# Patient Record
Sex: FEMALE | Race: WHITE | Employment: OTHER | URBAN - METROPOLITAN AREA
[De-identification: names, ages, dates, MRNs, and addresses within clinical notes are randomized per-mention and may not be internally consistent; named-entity substitution may affect disease eponyms.]

---

## 2017-01-10 PROCEDURE — 82570 ASSAY OF URINE CREATININE: CPT | Performed by: INTERNAL MEDICINE

## 2017-01-10 PROCEDURE — 82043 UR ALBUMIN QUANTITATIVE: CPT | Performed by: INTERNAL MEDICINE

## 2017-01-30 RX ORDER — ACETAMINOPHEN 325 MG/1
325 TABLET ORAL EVERY 6 HOURS PRN
Status: ON HOLD | COMMUNITY
End: 2017-02-27

## 2017-01-30 RX ORDER — ASPIRIN 325 MG
325 TABLET ORAL EVERY 6 HOURS PRN
COMMUNITY
End: 2017-02-17

## 2017-02-07 ENCOUNTER — LABORATORY ENCOUNTER (OUTPATIENT)
Dept: LAB | Facility: HOSPITAL | Age: 72
End: 2017-02-07
Attending: ORTHOPAEDIC SURGERY
Payer: MEDICARE

## 2017-02-07 ENCOUNTER — HOSPITAL ENCOUNTER (OUTPATIENT)
Dept: PHYSICAL THERAPY | Facility: HOSPITAL | Age: 72
Setting detail: THERAPIES SERIES
Discharge: HOME | End: 2017-02-07
Attending: ORTHOPAEDIC SURGERY
Payer: MEDICARE

## 2017-02-07 DIAGNOSIS — M17.11 PRIMARY OSTEOARTHRITIS OF RIGHT KNEE: ICD-10-CM

## 2017-02-07 LAB
ANTIBODY SCREEN: NEGATIVE
APTT PPP: 28.6 SECONDS (ref 25–34)
BILIRUB UR QL STRIP.AUTO: NEGATIVE
CLARITY UR REFRACT.AUTO: CLEAR
GLUCOSE UR STRIP.AUTO-MCNC: NEGATIVE MG/DL
INR BLD: 1 (ref 0.89–1.12)
KETONES UR STRIP.AUTO-MCNC: NEGATIVE MG/DL
NITRITE UR QL STRIP.AUTO: NEGATIVE
PH UR STRIP.AUTO: 8 [PH] (ref 4.5–8)
PROT UR STRIP.AUTO-MCNC: NEGATIVE MG/DL
PSA SERPL DL<=0.01 NG/ML-MCNC: 13.5 SECONDS (ref 12.3–14.8)
RBC UR QL AUTO: NEGATIVE
RH BLOOD TYPE: POSITIVE
SP GR UR STRIP.AUTO: <1.005 (ref 1–1.03)
UROBILINOGEN UR STRIP.AUTO-MCNC: <2 MG/DL

## 2017-02-07 PROCEDURE — 86900 BLOOD TYPING SEROLOGIC ABO: CPT

## 2017-02-07 PROCEDURE — 36415 COLL VENOUS BLD VENIPUNCTURE: CPT

## 2017-02-07 PROCEDURE — 85730 THROMBOPLASTIN TIME PARTIAL: CPT

## 2017-02-07 PROCEDURE — 86901 BLOOD TYPING SEROLOGIC RH(D): CPT

## 2017-02-07 PROCEDURE — 87081 CULTURE SCREEN ONLY: CPT

## 2017-02-07 PROCEDURE — 87086 URINE CULTURE/COLONY COUNT: CPT

## 2017-02-07 PROCEDURE — 85610 PROTHROMBIN TIME: CPT

## 2017-02-07 PROCEDURE — 81001 URINALYSIS AUTO W/SCOPE: CPT

## 2017-02-07 PROCEDURE — 86850 RBC ANTIBODY SCREEN: CPT

## 2017-02-27 ENCOUNTER — APPOINTMENT (OUTPATIENT)
Dept: GENERAL RADIOLOGY | Facility: HOSPITAL | Age: 72
DRG: 470 | End: 2017-02-27
Attending: PHYSICIAN ASSISTANT
Payer: MEDICARE

## 2017-02-27 ENCOUNTER — HOSPITAL ENCOUNTER (INPATIENT)
Facility: HOSPITAL | Age: 72
LOS: 2 days | Discharge: HOME HEALTH | DRG: 470 | End: 2017-03-01
Attending: ORTHOPAEDIC SURGERY | Admitting: ORTHOPAEDIC SURGERY
Payer: MEDICARE

## 2017-02-27 ENCOUNTER — ANESTHESIA EVENT (OUTPATIENT)
Dept: SURGERY | Facility: HOSPITAL | Age: 72
DRG: 470 | End: 2017-02-27
Payer: MEDICARE

## 2017-02-27 ENCOUNTER — ANESTHESIA (OUTPATIENT)
Dept: SURGERY | Facility: HOSPITAL | Age: 72
DRG: 470 | End: 2017-02-27
Payer: MEDICARE

## 2017-02-27 ENCOUNTER — SURGERY (OUTPATIENT)
Age: 72
End: 2017-02-27

## 2017-02-27 DIAGNOSIS — M17.11 PRIMARY OSTEOARTHRITIS OF RIGHT KNEE: Primary | ICD-10-CM

## 2017-02-27 PROCEDURE — 3E0T3CZ INTRODUCTION OF REGIONAL ANESTHETIC INTO PERIPHERAL NERVES AND PLEXI, PERCUTANEOUS APPROACH: ICD-10-PCS | Performed by: ANESTHESIOLOGY

## 2017-02-27 PROCEDURE — 88311 DECALCIFY TISSUE: CPT | Performed by: ORTHOPAEDIC SURGERY

## 2017-02-27 PROCEDURE — 0SRC0J9 REPLACEMENT OF RIGHT KNEE JOINT WITH SYNTHETIC SUBSTITUTE, CEMENTED, OPEN APPROACH: ICD-10-PCS | Performed by: ORTHOPAEDIC SURGERY

## 2017-02-27 PROCEDURE — 88305 TISSUE EXAM BY PATHOLOGIST: CPT | Performed by: ORTHOPAEDIC SURGERY

## 2017-02-27 PROCEDURE — 76942 ECHO GUIDE FOR BIOPSY: CPT | Performed by: ORTHOPAEDIC SURGERY

## 2017-02-27 PROCEDURE — 73560 X-RAY EXAM OF KNEE 1 OR 2: CPT

## 2017-02-27 DEVICE — ATTUNE KNEE SYSTEM TIBIAL INSERT ROTATING PLATFORM POSTERIOR STABILIZED 6 5MM AOX
Type: IMPLANTABLE DEVICE | Site: KNEE | Status: FUNCTIONAL
Brand: ATTUNE

## 2017-02-27 DEVICE — CEMENT BONE RADIOPAQ HOWMEDICA: Type: IMPLANTABLE DEVICE | Site: KNEE | Status: FUNCTIONAL

## 2017-02-27 DEVICE — ATTUNE KNEE SYSTEM FEMORAL POSTERIOR STABILIZED NARROW SIZE 6N RIGHT CEMENTED
Type: IMPLANTABLE DEVICE | Site: KNEE | Status: FUNCTIONAL
Brand: ATTUNE

## 2017-02-27 DEVICE — ATTUNE PATELLA MEDIALIZED ANATOMIC 35MM CEMENTED AOX
Type: IMPLANTABLE DEVICE | Site: KNEE | Status: FUNCTIONAL
Brand: ATTUNE

## 2017-02-27 DEVICE — ATTUNE KNEE SYSTEM TIBIAL BASE ROTATING PLATFORM SIZE 6 CEMENTED
Type: IMPLANTABLE DEVICE | Site: KNEE | Status: FUNCTIONAL
Brand: ATTUNE

## 2017-02-27 RX ORDER — HYDROMORPHONE HYDROCHLORIDE 1 MG/ML
0.5 INJECTION, SOLUTION INTRAMUSCULAR; INTRAVENOUS; SUBCUTANEOUS EVERY 2 HOUR PRN
Status: DISCONTINUED | OUTPATIENT
Start: 2017-02-27 | End: 2017-03-01

## 2017-02-27 RX ORDER — OXYCODONE HCL 10 MG/1
10 TABLET, FILM COATED, EXTENDED RELEASE ORAL
Status: COMPLETED | OUTPATIENT
Start: 2017-02-27 | End: 2017-02-27

## 2017-02-27 RX ORDER — DIPHENHYDRAMINE HYDROCHLORIDE 50 MG/ML
12.5 INJECTION INTRAMUSCULAR; INTRAVENOUS EVERY 4 HOURS PRN
Status: DISCONTINUED | OUTPATIENT
Start: 2017-02-27 | End: 2017-03-01

## 2017-02-27 RX ORDER — BISACODYL 10 MG
10 SUPPOSITORY, RECTAL RECTAL
Status: DISCONTINUED | OUTPATIENT
Start: 2017-02-27 | End: 2017-03-01

## 2017-02-27 RX ORDER — OXYCODONE HYDROCHLORIDE 15 MG/1
15 TABLET ORAL EVERY 4 HOURS PRN
Status: DISCONTINUED | OUTPATIENT
Start: 2017-02-27 | End: 2017-02-28

## 2017-02-27 RX ORDER — SODIUM PHOSPHATE, DIBASIC AND SODIUM PHOSPHATE, MONOBASIC 7; 19 G/133ML; G/133ML
1 ENEMA RECTAL ONCE AS NEEDED
Status: ACTIVE | OUTPATIENT
Start: 2017-02-27 | End: 2017-02-27

## 2017-02-27 RX ORDER — OXYCODONE HYDROCHLORIDE 5 MG/1
5 TABLET ORAL EVERY 4 HOURS PRN
Status: DISCONTINUED | OUTPATIENT
Start: 2017-02-27 | End: 2017-02-28

## 2017-02-27 RX ORDER — HYDROMORPHONE HYDROCHLORIDE 1 MG/ML
0.2 INJECTION, SOLUTION INTRAMUSCULAR; INTRAVENOUS; SUBCUTANEOUS EVERY 2 HOUR PRN
Status: DISCONTINUED | OUTPATIENT
Start: 2017-02-27 | End: 2017-03-01

## 2017-02-27 RX ORDER — POLYETHYLENE GLYCOL 3350 17 G/17G
17 POWDER, FOR SOLUTION ORAL DAILY PRN
Status: DISCONTINUED | OUTPATIENT
Start: 2017-02-27 | End: 2017-03-01

## 2017-02-27 RX ORDER — MEPERIDINE HYDROCHLORIDE 25 MG/ML
12.5 INJECTION INTRAMUSCULAR; INTRAVENOUS; SUBCUTANEOUS AS NEEDED
Status: DISCONTINUED | OUTPATIENT
Start: 2017-02-27 | End: 2017-02-27 | Stop reason: HOSPADM

## 2017-02-27 RX ORDER — ACETAMINOPHEN 325 MG/1
650 TABLET ORAL EVERY 6 HOURS PRN
Status: DISCONTINUED | OUTPATIENT
Start: 2017-02-27 | End: 2017-02-27 | Stop reason: HOSPADM

## 2017-02-27 RX ORDER — MIDAZOLAM HYDROCHLORIDE 1 MG/ML
1 INJECTION INTRAMUSCULAR; INTRAVENOUS EVERY 5 MIN PRN
Status: DISCONTINUED | OUTPATIENT
Start: 2017-02-27 | End: 2017-02-27 | Stop reason: HOSPADM

## 2017-02-27 RX ORDER — DOCUSATE SODIUM 100 MG/1
100 CAPSULE, LIQUID FILLED ORAL 2 TIMES DAILY
Status: DISCONTINUED | OUTPATIENT
Start: 2017-02-27 | End: 2017-03-01

## 2017-02-27 RX ORDER — SODIUM CHLORIDE, SODIUM LACTATE, POTASSIUM CHLORIDE, CALCIUM CHLORIDE 600; 310; 30; 20 MG/100ML; MG/100ML; MG/100ML; MG/100ML
INJECTION, SOLUTION INTRAVENOUS CONTINUOUS
Status: DISCONTINUED | OUTPATIENT
Start: 2017-02-27 | End: 2017-03-01

## 2017-02-27 RX ORDER — HYDROCODONE BITARTRATE AND ACETAMINOPHEN 10; 325 MG/1; MG/1
1-2 TABLET ORAL EVERY 4 HOURS PRN
Qty: 60 TABLET | Refills: 0 | Status: SHIPPED | OUTPATIENT
Start: 2017-03-08 | End: 2017-03-28 | Stop reason: ALTCHOICE

## 2017-02-27 RX ORDER — ONDANSETRON 2 MG/ML
4 INJECTION INTRAMUSCULAR; INTRAVENOUS EVERY 4 HOURS PRN
Status: DISCONTINUED | OUTPATIENT
Start: 2017-02-27 | End: 2017-03-01

## 2017-02-27 RX ORDER — DIPHENHYDRAMINE HYDROCHLORIDE 50 MG/ML
25 INJECTION INTRAMUSCULAR; INTRAVENOUS ONCE AS NEEDED
Status: ACTIVE | OUTPATIENT
Start: 2017-02-27 | End: 2017-02-27

## 2017-02-27 RX ORDER — ONDANSETRON 2 MG/ML
4 INJECTION INTRAMUSCULAR; INTRAVENOUS AS NEEDED
Status: DISCONTINUED | OUTPATIENT
Start: 2017-02-27 | End: 2017-02-27 | Stop reason: HOSPADM

## 2017-02-27 RX ORDER — HYDROCODONE BITARTRATE AND ACETAMINOPHEN 10; 325 MG/1; MG/1
1-2 TABLET ORAL EVERY 4 HOURS PRN
Qty: 80 TABLET | Refills: 0 | Status: SHIPPED | OUTPATIENT
Start: 2017-02-27 | End: 2017-03-09

## 2017-02-27 RX ORDER — HYDROMORPHONE HYDROCHLORIDE 1 MG/ML
0.3 INJECTION, SOLUTION INTRAMUSCULAR; INTRAVENOUS; SUBCUTANEOUS EVERY 2 HOUR PRN
Status: DISCONTINUED | OUTPATIENT
Start: 2017-02-27 | End: 2017-03-01

## 2017-02-27 RX ORDER — NALOXONE HYDROCHLORIDE 0.4 MG/ML
80 INJECTION, SOLUTION INTRAMUSCULAR; INTRAVENOUS; SUBCUTANEOUS AS NEEDED
Status: DISCONTINUED | OUTPATIENT
Start: 2017-02-27 | End: 2017-02-27 | Stop reason: HOSPADM

## 2017-02-27 RX ORDER — ACETAMINOPHEN 325 MG/1
TABLET ORAL
Status: COMPLETED
Start: 2017-02-27 | End: 2017-02-27

## 2017-02-27 RX ORDER — OXYCODONE HCL 10 MG/1
10 TABLET, FILM COATED, EXTENDED RELEASE ORAL
Status: COMPLETED | OUTPATIENT
Start: 2017-02-27 | End: 2017-02-28

## 2017-02-27 RX ORDER — SODIUM CHLORIDE 9 MG/ML
INJECTION, SOLUTION INTRAVENOUS CONTINUOUS
Status: DISCONTINUED | OUTPATIENT
Start: 2017-02-27 | End: 2017-03-01

## 2017-02-27 RX ORDER — SENNOSIDES 8.6 MG
17.2 TABLET ORAL NIGHTLY
Status: DISCONTINUED | OUTPATIENT
Start: 2017-02-27 | End: 2017-02-27

## 2017-02-27 RX ORDER — METOCLOPRAMIDE HYDROCHLORIDE 5 MG/ML
10 INJECTION INTRAMUSCULAR; INTRAVENOUS AS NEEDED
Status: DISCONTINUED | OUTPATIENT
Start: 2017-02-27 | End: 2017-02-27 | Stop reason: HOSPADM

## 2017-02-27 RX ORDER — CYCLOBENZAPRINE HCL 5 MG
5 TABLET ORAL 3 TIMES DAILY PRN
Status: DISCONTINUED | OUTPATIENT
Start: 2017-02-27 | End: 2017-03-01

## 2017-02-27 RX ORDER — MELATONIN
325
Status: DISCONTINUED | OUTPATIENT
Start: 2017-02-28 | End: 2017-03-01

## 2017-02-27 RX ORDER — SENNOSIDES 8.6 MG
17.2 TABLET ORAL NIGHTLY PRN
Status: DISCONTINUED | OUTPATIENT
Start: 2017-02-27 | End: 2017-03-01

## 2017-02-27 RX ORDER — METOCLOPRAMIDE HYDROCHLORIDE 5 MG/ML
10 INJECTION INTRAMUSCULAR; INTRAVENOUS EVERY 6 HOURS PRN
Status: DISCONTINUED | OUTPATIENT
Start: 2017-02-27 | End: 2017-03-01

## 2017-02-27 RX ORDER — DOCUSATE SODIUM 100 MG/1
100 CAPSULE, LIQUID FILLED ORAL 2 TIMES DAILY
Qty: 60 CAPSULE | Refills: 0 | Status: SHIPPED | OUTPATIENT
Start: 2017-02-27 | End: 2017-04-17 | Stop reason: ALTCHOICE

## 2017-02-27 RX ORDER — OXYCODONE HYDROCHLORIDE 10 MG/1
20 TABLET ORAL EVERY 4 HOURS PRN
Status: DISCONTINUED | OUTPATIENT
Start: 2017-02-27 | End: 2017-02-28

## 2017-02-27 RX ORDER — ACETAMINOPHEN 325 MG/1
650 TABLET ORAL 4 TIMES DAILY
Status: DISPENSED | OUTPATIENT
Start: 2017-02-27 | End: 2017-03-01

## 2017-02-27 RX ORDER — HYDROCODONE BITARTRATE AND ACETAMINOPHEN 10; 325 MG/1; MG/1
2 TABLET ORAL AS NEEDED
Status: DISCONTINUED | OUTPATIENT
Start: 2017-02-27 | End: 2017-02-27 | Stop reason: HOSPADM

## 2017-02-27 RX ORDER — DIPHENHYDRAMINE HCL 25 MG
25 CAPSULE ORAL EVERY 4 HOURS PRN
Status: DISCONTINUED | OUTPATIENT
Start: 2017-02-27 | End: 2017-03-01

## 2017-02-27 RX ORDER — HYDROMORPHONE HYDROCHLORIDE 1 MG/ML
0.4 INJECTION, SOLUTION INTRAMUSCULAR; INTRAVENOUS; SUBCUTANEOUS EVERY 5 MIN PRN
Status: DISCONTINUED | OUTPATIENT
Start: 2017-02-27 | End: 2017-02-27 | Stop reason: HOSPADM

## 2017-02-27 RX ORDER — FERROUS SULFATE 325(65) MG
1 TABLET ORAL DAILY
Qty: 30 TABLET | Refills: 0 | Status: SHIPPED | OUTPATIENT
Start: 2017-02-27 | End: 2017-03-09

## 2017-02-27 RX ORDER — OXYCODONE HYDROCHLORIDE 10 MG/1
10 TABLET ORAL EVERY 4 HOURS PRN
Status: DISCONTINUED | OUTPATIENT
Start: 2017-02-27 | End: 2017-02-28

## 2017-02-27 RX ORDER — HYDROCODONE BITARTRATE AND ACETAMINOPHEN 10; 325 MG/1; MG/1
1 TABLET ORAL AS NEEDED
Status: DISCONTINUED | OUTPATIENT
Start: 2017-02-27 | End: 2017-02-27 | Stop reason: HOSPADM

## 2017-02-27 RX ORDER — HYDROMORPHONE HYDROCHLORIDE 1 MG/ML
0.4 INJECTION, SOLUTION INTRAMUSCULAR; INTRAVENOUS; SUBCUTANEOUS EVERY 2 HOUR PRN
Status: DISCONTINUED | OUTPATIENT
Start: 2017-02-27 | End: 2017-03-01

## 2017-02-27 NOTE — PROGRESS NOTES
NURSING ADMISSION NOTE      Patient admitted via bed, post right total knee replacement. Oriented to room. Safety precautions initiated. Bed in low position. Call light in reach. Discussed post-op orders, verbalized understanding.

## 2017-02-27 NOTE — ANESTHESIA POSTPROCEDURE EVALUATION
[facility] Patient Status:  Surgery Admit   Age/Gender 74year old female MRN [id number]   Location [address] [facility] Attending Delano Titus MD   Hosp Day # 0 PCP Nancy Mercer MD       Anesthesia Po

## 2017-02-27 NOTE — ANESTHESIA PREPROCEDURE EVALUATION
PRE-OP EVALUATION    Patient Name: Trish Patel    Pre-op Diagnosis: RIGHT KNEE OSTEOARTHRITIS      Procedure(s):  RIGHT TOTAL KNEE REPLACEMENT      Surgeon(s) and Role:     Mal Cornejo MD - Primary    Pre-op vitals reviewed.         Body mass index i POLYPECTOMY 58597;  Surgeon: Itz Jasmine MD;  Location: [address] [place] [address]    PATIENT DOCUMENTED NOT TO HAVE EXPERIENCED ANY OF THE FOLLOWING EVENTS N/A 9/22/2015    Comment Procedure: COLONOSCOPY, POSSIBLE BIOPSY, POSSIBLE POLYPECTOMY 05229;  Surge

## 2017-02-27 NOTE — H&P
Lupis Hewitt   2/17/2017 10:00 AM   Office Visit   MRN:  [id number]    Description: 74year old female   Provider: Drew Urena MD   Department: [address] [place] [address]         Click to print [address] N/A 9/22/2015     Procedure: COLONOSCOPY, POSSIBLE BIOPSY, POSSIBLE POLYPECTOMY 63007;  Surgeon: Idania Richardson MD;  Location: [address] [place] [address]      PATIENT DOCUMENTED NOT TO HAVE EXPERIENCED ANY OF THE FOLLOWING EVENTS N/A 9/22/2015     Procedure: C 5/12/10      Comment  EDWARD  SCORE 2      COLONOSCOPY    2006      Comment  Repeat 5 years      COLONOSCOPY,BIOPSY  N/A  9/22/2015      Comment  Procedure: COLONOSCOPY, POSSIBLE BIOPSY, POSSIBLE POLYPECTOMY 35199;  Surgeon: Luis Giron MD;  Location: [facility] hx of allergy or asthma      EXAM:    /82 mmHg  Pulse 72  Temp(Src) 98.6 °F (37 °C)  Ht 5' 8\" (1.727 m)  Wt 191 lb (86.637 kg)  BMI 29.05 kg/m2  SpO2 99%  GENERAL: well developed, well nourished,in no apparent distress  SKIN: no rashes,no suspicious End Of Enc - as of 02/17/2017         Disp Refills Start End     Misc Natural Products (GRAPE SEED EXTRACT OR)  (Taking)         Sig: Take 1 tablet by mouth daily.     Class: Historical     Route: Oral     acetaminophen 325 MG Oral Tab  (Taking)         Si 9: 57 AM : EKG. 2.17. 17Cardiology Test - Scan on 2/22/2017 9:57 AM : EKG. 2.17.17       Patient-Level Documents:          Patient Identification - Scan on 2/7/2017 10:56 AMPatient Identification - Scan on 2/7/2017 10:56 AM         Insurance Card Scan - Scan o

## 2017-02-27 NOTE — OPERATIVE REPORT
TOTAL KNEE OPERATIVE REPORT:  Shelby Valverde       [id number]     2/1/1945    PREOP DX: RIGHT  KNEE PRIMARY OSTEOARTHRITIS  POSTOP DX:RIGHT KNEE PRIMARY OSTEOARTHRITIS  PROCEDURE: RIGHT TOTAL KNEE REPLACEMENT  SURGEON: MD FIRST Dennis Manning BLOCK.  GAPS WERE FOUND TO BE EQUAL. VALGUS/VARUS STRESS TEST WAS STABLE. FEMUR WAS FINISHED OFF WITH CHAMFER AND [address] CUTS IN USUAL FASHION. POSTERIOR FEMORAL OSTEOPHYTES WERE REMOVED. POSTERIOR CAPSULAR RELEASE WAS DONE CAREFULLY.     PROXIMAL TIBIA

## 2017-02-28 LAB
ERYTHROCYTE [DISTWIDTH] IN BLOOD BY AUTOMATED COUNT: 12.8 % (ref 11.5–16)
HCT VFR BLD AUTO: 36.5 % (ref 34–50)
HGB BLD-MCNC: 12.1 G/DL (ref 12–16)
MCH RBC QN AUTO: 29.7 PG (ref 27–33.2)
MCHC RBC AUTO-ENTMCNC: 33.2 G/DL (ref 31–37)
MCV RBC AUTO: 89.7 FL (ref 81–100)
PLATELET # BLD AUTO: 182 10(3)UL (ref 150–450)
RBC # BLD AUTO: 4.07 X10(6)UL (ref 3.8–5.1)
RED CELL DISTRIBUTION WIDTH-SD: 42.1 FL (ref 35.1–46.3)
WBC # BLD AUTO: 8.1 X10(3) UL (ref 4–13)

## 2017-02-28 PROCEDURE — 97530 THERAPEUTIC ACTIVITIES: CPT

## 2017-02-28 PROCEDURE — 97165 OT EVAL LOW COMPLEX 30 MIN: CPT

## 2017-02-28 PROCEDURE — 97162 PT EVAL MOD COMPLEX 30 MIN: CPT

## 2017-02-28 PROCEDURE — 97535 SELF CARE MNGMENT TRAINING: CPT

## 2017-02-28 PROCEDURE — 97150 GROUP THERAPEUTIC PROCEDURES: CPT

## 2017-02-28 PROCEDURE — 85027 COMPLETE CBC AUTOMATED: CPT | Performed by: ORTHOPAEDIC SURGERY

## 2017-02-28 PROCEDURE — 97116 GAIT TRAINING THERAPY: CPT

## 2017-02-28 RX ORDER — OXYCODONE HYDROCHLORIDE 5 MG/1
5 TABLET ORAL EVERY 4 HOURS PRN
Status: ACTIVE | OUTPATIENT
Start: 2017-02-28 | End: 2017-03-01

## 2017-02-28 RX ORDER — OXYCODONE HYDROCHLORIDE 15 MG/1
15 TABLET ORAL EVERY 4 HOURS PRN
Status: ACTIVE | OUTPATIENT
Start: 2017-02-28 | End: 2017-03-01

## 2017-02-28 RX ORDER — OXYCODONE HYDROCHLORIDE 10 MG/1
10 TABLET ORAL EVERY 4 HOURS PRN
Status: DISPENSED | OUTPATIENT
Start: 2017-02-28 | End: 2017-03-01

## 2017-02-28 RX ORDER — OXYCODONE HYDROCHLORIDE 10 MG/1
20 TABLET ORAL EVERY 4 HOURS PRN
Status: ACTIVE | OUTPATIENT
Start: 2017-02-28 | End: 2017-03-01

## 2017-02-28 RX ORDER — HYDROCODONE BITARTRATE AND ACETAMINOPHEN 10; 325 MG/1; MG/1
2 TABLET ORAL EVERY 4 HOURS PRN
Status: DISCONTINUED | OUTPATIENT
Start: 2017-03-01 | End: 2017-03-01

## 2017-02-28 RX ORDER — HYDROCODONE BITARTRATE AND ACETAMINOPHEN 10; 325 MG/1; MG/1
1 TABLET ORAL EVERY 4 HOURS PRN
Status: DISCONTINUED | OUTPATIENT
Start: 2017-03-01 | End: 2017-03-01

## 2017-02-28 NOTE — CM/SW NOTE
02/28/17 1300   CM/SW Referral Data   Referral Source Physician;Social Work (self-referral)   Reason for Referral Discharge planning   Informant Patient   Pertinent Medical Hx   Primary Care Physician Name [address]   Patient Info   Patient's Mental

## 2017-02-28 NOTE — PLAN OF CARE
Problem: MUSCULOSKELETAL - ADULT  Goal: Return mobility to safest level of function  INTERVENTIONS:  - Assess patient stability and activity tolerance for standing, transferring and ambulating w/ or w/o assistive devices  - Assist with transfers and ambula

## 2017-02-28 NOTE — HOME CARE LIAISON
Received referral for [facility] on d/c for SN/PT. Met with patient who is agreeable to [facility] on d/c. Agency brochure given to patient. Referral sent to [facility] via [address]    Thank you for this referral,   Bard Serrato

## 2017-02-28 NOTE — PLAN OF CARE
MUSCULOSKELETAL - ADULT    • Maintain proper alignment of affected body part Progressing        PAIN - ADULT    • Verbalizes/displays adequate comfort level or patient's stated pain goal Progressing        SAFETY ADULT - FALL    • Free from fall injury Pro

## 2017-02-28 NOTE — PLAN OF CARE
PAIN - ADULT    • Verbalizes/displays adequate comfort level or patient's stated pain goal Progressing          SAFETY ADULT - FALL    • Free from fall injury Progressing        Patient rating pain a \"2-3\" while up in chair.   Medicated with oxy IR 10mg t

## 2017-02-28 NOTE — PHYSICAL THERAPY NOTE
PHYSICAL THERAPY KNEE TREATMENT NOTE - INPATIENT     Room Number: 610/251-S     Session: 1 and 2   Number of Visits to Meet Established Goals: 5    Presenting Problem: S/p Right TKA on 02/27/17    Problem List  Active Problems:    * No active hospital pro Sitting: Fair +  Static Standing: Poor +  Dynamic Standing: Poor +    ACTIVITY TOLERANCE  Room air  No shortness of breath    AM-PAC '6-Clicks' INPATIENT SHORT FORM - BASIC MOBILITY  How much difficulty does the patient currently have. ..  -   Turning over reps   Glut Sets 10 reps 15 reps   Hip Abd/Add 10 reps 15 reps   Heel slides 10 reps 15 reps   Saq 10 reps 15 reps   SLR 10 reps 15 reps   Sitting Knee Flexion 10 reps 15 reps   Standing heel/toe raises 10 reps 15 reps   Standing knee flexion 10 reps 15 re degrees flexion      Goal #6           Goal Comments: Goals established on 2/28/2017 - all goals ongoing as of 2/28/2017

## 2017-02-28 NOTE — OCCUPATIONAL THERAPY NOTE
OCCUPATIONAL THERAPY EVALUATION - INPATIENT     Room Number: 892/219-D  Evaluation Date: 2/28/2017  Type of Evaluation: Initial  Presenting Problem: R TKR    Physician Order: IP Consult to Occupational Therapy  Reason for Therapy: ADL/IADL Dysfunction and as a  \"long time ago. \"       OBJECTIVE  Precautions:  Other (Comment) (Surgical)  Fall Risk: High fall risk    WEIGHT BEARING RESTRICTION  Weight Bearing Restriction: R lower extremity        R Lower Extremity: Weight Bearing as Tolerated balance once after sit to stand. CGA with RW to ambulate with RW, CGA and cueing for hand placement during toilet transfer. Supervision bed mobility. Patient End of Session: Up in chair;Needs met;Call light within reach;RN aware of session/findings; Al

## 2017-02-28 NOTE — PROGRESS NOTES
[facility] Hospitalist Progress Note                                                                   [address]  2/1/1945    CC:  Fu post op    Interval History:  - Doing well, pain controlle the last 72 hours. ROS: no change to ROS from my documentation yesterday, except as otherwise noted in the Interval History above.     Assessment/Plan:    S/P TKA  - Post op care per ortho.  Continue PT/OT.  Pain is currently controlled.  Xarelto for D

## 2017-02-28 NOTE — PROGRESS NOTES
Orthopedic surgery progress note    Jolane Floor Patient Status:  Inpatient    1945 MRN [id number]   [facility] 3SW-A Attending Ricardo Mcmillan MD   Hosp Day # 1 PCP Kacie Shine MD       Subjective:  No major complaints.   No

## 2017-02-28 NOTE — PHYSICAL THERAPY NOTE
PHYSICAL THERAPY KNEE EVALUATION - INPATIENT     Room Number: 775/323-X  Evaluation Date: 2/28/2017  Type of Evaluation: Initial  Physician Order: PT Eval and Treat    Presenting Problem: S/p Right TKA on 02/27/17  Reason for Therapy: Mobility Dysfunction care.Ambulated without AD.  will be able to assist as needed while recovering @ home    SUBJECTIVE  \"Pain in my knee was bad last night. Now it feels better. \" Patient was participatory & motivated.     Patient self-stated goal is to be able to walk walk in hospital room?: A Little   -   Climbing 3-5 steps with a railing?: Total       AM-PAC Score:  Raw Score: 18   PT Approx Degree of Impairment Score: 46.58%   Standardized Score (AM-PAC Scale): 43.63   CMS Modifier (G-Code): CK    FUNCTIONAL ABILITY level of function. DISCHARGE RECOMMENDATIONS  PT Discharge Recommendations: Home with home health PT    PLAN  PT Treatment Plan: Bed mobility; Endurance; Energy conservation;Patient education; Family education;Gait training;Neuromuscular re-educate;Range o

## 2017-02-28 NOTE — PLAN OF CARE
Up with 1 assist to the commode, voided freely . Tolerated being up in the recliner since 0400 but is requesting to go back to bed at this time . Slow but steady gait noted , no buckling . Will continue to monitor .

## 2017-02-28 NOTE — CONSULTS
[facility] Hospitalist Initial Consult       CC: post-op medical management s/p R TKA    History of Present Illness: Patient is a 74year old female with PMH sig for OA who presents sp the above procedure.  She tolerated the procedure well without any immediate co loss  Eyes: Negative for blurry vision  ENT: Negative for sore throat  Respiratory: Negative for cough or hemoptysis  CV: Negative for CP  GI: Negative for nausea or vomiting  MSK: Negative for myalgias  Skin: Negative for Rash  Hematology: Negative for ea [facility] Hospitalist  Pager: [phone]

## 2017-02-28 NOTE — PROGRESS NOTES
Ace wrap removed. Patient measured for tubigrip sleeve to surgical leg. Applied. Patient instructed on and verbalized understanding.

## 2017-03-01 VITALS
HEART RATE: 67 BPM | DIASTOLIC BLOOD PRESSURE: 56 MMHG | OXYGEN SATURATION: 95 % | RESPIRATION RATE: 20 BRPM | WEIGHT: 190 LBS | TEMPERATURE: 98 F | BODY MASS INDEX: 29.82 KG/M2 | SYSTOLIC BLOOD PRESSURE: 110 MMHG | HEIGHT: 67 IN

## 2017-03-01 LAB
ERYTHROCYTE [DISTWIDTH] IN BLOOD BY AUTOMATED COUNT: 12.7 % (ref 11.5–16)
HCT VFR BLD AUTO: 32.5 % (ref 34–50)
HGB BLD-MCNC: 11.1 G/DL (ref 12–16)
MCH RBC QN AUTO: 29.9 PG (ref 27–33.2)
MCHC RBC AUTO-ENTMCNC: 34.2 G/DL (ref 31–37)
MCV RBC AUTO: 87.6 FL (ref 81–100)
PLATELET # BLD AUTO: 179 10(3)UL (ref 150–450)
RBC # BLD AUTO: 3.71 X10(6)UL (ref 3.8–5.1)
RED CELL DISTRIBUTION WIDTH-SD: 40.9 FL (ref 35.1–46.3)
WBC # BLD AUTO: 10.2 X10(3) UL (ref 4–13)

## 2017-03-01 PROCEDURE — 97535 SELF CARE MNGMENT TRAINING: CPT

## 2017-03-01 PROCEDURE — 97150 GROUP THERAPEUTIC PROCEDURES: CPT

## 2017-03-01 PROCEDURE — 97116 GAIT TRAINING THERAPY: CPT

## 2017-03-01 PROCEDURE — 85027 COMPLETE CBC AUTOMATED: CPT | Performed by: ORTHOPAEDIC SURGERY

## 2017-03-01 NOTE — PROGRESS NOTES
Patient and  () attended group discharge education lunch. Discharge education provided utilizing \"joint replacement discharge instructions\" sheet. Teach back done. Questions solicited and answered. Tolerated activity well.  Initial coverlet

## 2017-03-01 NOTE — PHYSICAL THERAPY NOTE
PHYSICAL THERAPY KNEE TREATMENT NOTE - INPATIENT     Room Number: 431/371-W     Session: 3  Number of Visits to Meet Established Goals: 5    Presenting Problem: S/p Right TKA on 02/27/17    Problem List  Active Problems:    * No active hospital problems. Fair +  Static Standing: Fair -  Dynamic Standing: Poor +    ACTIVITY TOLERANCE  Room air  No shortness of breath    AM-PAC '6-Clicks' INPATIENT SHORT FORM - BASIC MOBILITY  How much difficulty does the patient currently have. ..  -   Turning over in bed (i reach;RN aware of session/findings; All patient questions and concerns addressed;SCDs in place; Ice applied    ASSESSMENT     Pt seen QD in PT group for gait training, environmental barriers, and BLE strengthening: S/P R TKA.  At this time, Pt. presents with

## 2017-03-01 NOTE — PROGRESS NOTES
[facility] Hospitalist Progress Note                                                                   [address]  2/1/1945    CC:  Fu post op    Interval History:  - Doing well, pain controlle my documentation yesterday, except as otherwise noted in the Interval History above.     Assessment/Plan:    S/P TKA  - Post op care per ortho.  Continue PT/OT.  Pain is currently controlled.  Xarelto for DVT prophylaxis.    - Labs stable     Post-operative

## 2017-03-01 NOTE — PLAN OF CARE
Impaired Activities of Daily Living    • Achieve highest/safest level of independence in self care Progressing        MUSCULOSKELETAL - ADULT    • Return mobility to safest level of function Progressing    • Maintain proper alignment of affected body part

## 2017-03-01 NOTE — PROGRESS NOTES
Post Op Day 1 Ortho Note    Status Post Nerve Block:  Type of Nerve Block: Right Femoral  Single Injection Nerve Block    Post op review: No evidence of immediate block related complications, No paresthesia noted, Able to plantar flex foot, Able to dorsifl

## 2017-03-01 NOTE — PLAN OF CARE
Patient's pain controlled on pain protocol with oxy IR 5-10mg for b/t pain. Up in chair most of shift and ambulated in halls x3. Compliant with Call, Don't Fall protocol.

## 2017-03-01 NOTE — PROGRESS NOTES
Orthopedic surgery progress note    Viveca Mealy Patient Status:  Inpatient    1945 MRN [id number]   [facility] 3SW-A Attending Soni Merritt MD   Hosp Day # 2 PCP Alvaro Cornell MD       Subjective:  No major complaints.  Lit

## 2017-03-01 NOTE — PROGRESS NOTES
Acute Pain Service    Post Op Day 2 Ortho Note     Patient states Fiorella Puneet is working well to manage pain; denies itching/nausea/dizziness. Patient able to bear weight on sx leg; equal sensation in BLE. No further recommendations. Will sign-off.  Plan for d

## 2017-03-01 NOTE — OCCUPATIONAL THERAPY NOTE
OCCUPATIONAL THERAPY TREATMENT NOTE - INPATIENT     Room Number: 959/676-L  Session: 1   Number of Visits to Meet Established Goals: 1    Presenting Problem: R TKR    History related to current admission: Pt was admitted on 2/27 for R TKR.           Problem another person does the patient currently need…  -   Putting on and taking off regular lower body clothing?: A Little  -   Bathing (including washing, rinsing, drying)?: A Little  -   Toileting, which includes using toilet, bedpan or urinal? : A Little  - supervision-met

## 2017-03-01 NOTE — PLAN OF CARE
DISCHARGE PLANNING    • Discharge to home or other facility with appropriate resources Adequate for Discharge        Impaired Activities of Daily Living    • Achieve highest/safest level of independence in self care Adequate for Discharge        Yasmeen Marie

## 2017-03-02 NOTE — PAYOR COMM NOTE
Attending Physician: No att. providers found    Review Type: ADMISSION   Reviewer: Ghazala Doan       Date: March 2, 2017 - 3:29 PM  Payor: Denise Braga Number: [id number]  Admit date: 2/27/2017 12:15 PM   Admitted from Emergency Dept as otherwise noted in HPI  Constitutional: Negative for weight loss  Eyes: Negative for blurry vision  ENT: Negative for sore throat  Respiratory: Negative for cough or hemoptysis  CV: Negative for CP  GI: Negative for nausea or vomiting  MSK: Negative for participate in the care of this patient.      Jaclyn Chaney MD  [address] [place] [address] Hospitalist  Pager: [phone]        MEDICATIONS ADMINISTERED IN LAST 1 DAY:  OxyCODONE HCl IR (ROXICODONE) immediate release tab 10 mg  Dose: 10 mg Freq: Every 4 hours PRN Route: OR

## 2017-03-02 NOTE — CM/SW NOTE
03/02/17 0800   Discharge disposition   Discharged to: Home-Health   Name of Facillity/Home Care/Hospice Residential   Discharge transportation Private car   DC 3/1/17

## 2017-03-02 NOTE — DISCHARGE SUMMARY
Discharge Summary  Patient ID:  Teresita Durhamchnamrata  [id number]  20 year old  2/1/1945    Admit date: 2/27/2017    Discharge date and time: 3/1/17    Attending Physician: No att. providers found     Reason for admission: right knee osteoarthritis    Discharge D

## 2017-03-03 NOTE — PAYOR COMM NOTE
Attending Physician: No att. providers found    Review Type: CONTINUED STAY  Reviewer: Denisse Feliz     Date: March 3, 2017 - 9:38 AM  Payor: Denise Braga Number: [id number]  Admit date: 2/27/2017 12:15 PM        REVIEWER COMMENTS  2/ therapy  4) Case management  5) Social work  6) Pain management    Procedures: right TKR    Disposition: home    Patient Instructions:    Discharge Medication List as of 3/1/2017  1:33 PM    START taking these medications    !! HYDROcodone-acetaminophen (N

## 2017-03-09 ENCOUNTER — APPOINTMENT (OUTPATIENT)
Dept: CT IMAGING | Facility: HOSPITAL | Age: 72
DRG: 643 | End: 2017-03-09
Attending: EMERGENCY MEDICINE
Payer: MEDICARE

## 2017-03-09 ENCOUNTER — APPOINTMENT (OUTPATIENT)
Dept: GENERAL RADIOLOGY | Facility: HOSPITAL | Age: 72
DRG: 643 | End: 2017-03-09
Attending: EMERGENCY MEDICINE
Payer: MEDICARE

## 2017-03-09 ENCOUNTER — HOSPITAL ENCOUNTER (INPATIENT)
Facility: HOSPITAL | Age: 72
LOS: 12 days | Discharge: HOME | DRG: 643 | End: 2017-03-21
Attending: EMERGENCY MEDICINE | Admitting: HOSPITALIST
Payer: MEDICARE

## 2017-03-09 DIAGNOSIS — D72.829 LEUKOCYTOSIS, UNSPECIFIED TYPE: ICD-10-CM

## 2017-03-09 DIAGNOSIS — E87.1 HYPONATREMIA: Primary | ICD-10-CM

## 2017-03-09 DIAGNOSIS — J18.9 COMMUNITY ACQUIRED PNEUMONIA: ICD-10-CM

## 2017-03-09 DIAGNOSIS — R41.0 DISORIENTATION: ICD-10-CM

## 2017-03-09 PROCEDURE — 80053 COMPREHEN METABOLIC PANEL: CPT | Performed by: NURSE PRACTITIONER

## 2017-03-09 PROCEDURE — 85610 PROTHROMBIN TIME: CPT | Performed by: EMERGENCY MEDICINE

## 2017-03-09 PROCEDURE — 84300 ASSAY OF URINE SODIUM: CPT | Performed by: HOSPITALIST

## 2017-03-09 PROCEDURE — 99285 EMERGENCY DEPT VISIT HI MDM: CPT

## 2017-03-09 PROCEDURE — 74177 CT ABD & PELVIS W/CONTRAST: CPT

## 2017-03-09 PROCEDURE — 83930 ASSAY OF BLOOD OSMOLALITY: CPT | Performed by: HOSPITALIST

## 2017-03-09 PROCEDURE — 87040 BLOOD CULTURE FOR BACTERIA: CPT | Performed by: EMERGENCY MEDICINE

## 2017-03-09 PROCEDURE — 85025 COMPLETE CBC W/AUTO DIFF WBC: CPT | Performed by: HOSPITALIST

## 2017-03-09 PROCEDURE — 83605 ASSAY OF LACTIC ACID: CPT | Performed by: EMERGENCY MEDICINE

## 2017-03-09 PROCEDURE — 80053 COMPREHEN METABOLIC PANEL: CPT | Performed by: EMERGENCY MEDICINE

## 2017-03-09 PROCEDURE — 81001 URINALYSIS AUTO W/SCOPE: CPT | Performed by: EMERGENCY MEDICINE

## 2017-03-09 PROCEDURE — 84443 ASSAY THYROID STIM HORMONE: CPT | Performed by: HOSPITALIST

## 2017-03-09 PROCEDURE — 96374 THER/PROPH/DIAG INJ IV PUSH: CPT

## 2017-03-09 PROCEDURE — 51701 INSERT BLADDER CATHETER: CPT

## 2017-03-09 PROCEDURE — 36415 COLL VENOUS BLD VENIPUNCTURE: CPT

## 2017-03-09 PROCEDURE — 82533 TOTAL CORTISOL: CPT | Performed by: HOSPITALIST

## 2017-03-09 PROCEDURE — 96361 HYDRATE IV INFUSION ADD-ON: CPT

## 2017-03-09 PROCEDURE — 85730 THROMBOPLASTIN TIME PARTIAL: CPT | Performed by: EMERGENCY MEDICINE

## 2017-03-09 PROCEDURE — 87081 CULTURE SCREEN ONLY: CPT | Performed by: NURSE PRACTITIONER

## 2017-03-09 PROCEDURE — 96375 TX/PRO/DX INJ NEW DRUG ADDON: CPT

## 2017-03-09 PROCEDURE — 70450 CT HEAD/BRAIN W/O DYE: CPT

## 2017-03-09 PROCEDURE — 84145 PROCALCITONIN (PCT): CPT | Performed by: NURSE PRACTITIONER

## 2017-03-09 PROCEDURE — 96376 TX/PRO/DX INJ SAME DRUG ADON: CPT

## 2017-03-09 PROCEDURE — 85025 COMPLETE CBC W/AUTO DIFF WBC: CPT | Performed by: EMERGENCY MEDICINE

## 2017-03-09 PROCEDURE — 71010 XR CHEST AP PORTABLE  (CPT=71010): CPT

## 2017-03-09 PROCEDURE — 80061 LIPID PANEL: CPT | Performed by: HOSPITALIST

## 2017-03-09 RX ORDER — LEVOFLOXACIN 5 MG/ML
500 INJECTION, SOLUTION INTRAVENOUS ONCE
Status: DISCONTINUED | OUTPATIENT
Start: 2017-03-09 | End: 2017-03-09

## 2017-03-09 RX ORDER — LORAZEPAM 2 MG/ML
0.5 INJECTION INTRAMUSCULAR ONCE
Status: COMPLETED | OUTPATIENT
Start: 2017-03-09 | End: 2017-03-09

## 2017-03-09 RX ORDER — SODIUM CHLORIDE 9 MG/ML
INJECTION, SOLUTION INTRAVENOUS CONTINUOUS
Status: DISCONTINUED | OUTPATIENT
Start: 2017-03-09 | End: 2017-03-11

## 2017-03-09 RX ORDER — LORAZEPAM 2 MG/ML
1 INJECTION INTRAMUSCULAR ONCE
Status: COMPLETED | OUTPATIENT
Start: 2017-03-09 | End: 2017-03-09

## 2017-03-09 RX ORDER — MORPHINE SULFATE 4 MG/ML
INJECTION, SOLUTION INTRAMUSCULAR; INTRAVENOUS
Status: COMPLETED
Start: 2017-03-09 | End: 2017-03-09

## 2017-03-09 RX ORDER — LORAZEPAM 2 MG/ML
INJECTION INTRAMUSCULAR
Status: COMPLETED
Start: 2017-03-09 | End: 2017-03-09

## 2017-03-09 RX ORDER — MORPHINE SULFATE 4 MG/ML
4 INJECTION, SOLUTION INTRAMUSCULAR; INTRAVENOUS ONCE
Status: COMPLETED | OUTPATIENT
Start: 2017-03-09 | End: 2017-03-09

## 2017-03-09 RX ORDER — ACETAMINOPHEN 325 MG/1
650 TABLET ORAL EVERY 6 HOURS PRN
Status: DISCONTINUED | OUTPATIENT
Start: 2017-03-09 | End: 2017-03-21

## 2017-03-09 NOTE — ED INITIAL ASSESSMENT (HPI)
Patient had knee surgery on 2/27, abdominal pain started last night. Last bowel movement yesterday. New onset confusion, worse today.

## 2017-03-09 NOTE — ED PROVIDER NOTES
Patient Seen in: [facility] Emergency Department    History   Patient presents with:  Altered Mental Status (neurologic)  Abdomen/Flank Pain (GI/)    Stated Complaint: confusion, abdominal pain.     HPI    77-year-old female presents emergency depa (TAB-A-ANDRESSA MAXIMUM) Oral Tab,  Take 1 tablet by mouth daily. Grape Seed Extract 50 MG Oral Cap,  Take 50 mg by mouth daily. HYDROcodone-acetaminophen (NORCO)  MG Oral Tab,  Take 1-2 tablets by mouth every 4 (four) hours as needed for Pain.  Not membranes are moist, there is no erythema or exudate in the posterior pharynx  Neck: Supple no JVD no lymphadenopathy no meningismus no carotid bruit  CV: Regular rate and rhythm no murmur rub  Respiratory: Clear to auscultation bilaterally no crackles no LIPID PANEL - Normal   CBC WITH DIFFERENTIAL WITH PLATELET    Narrative: The following orders were created for panel order CBC WITH DIFFERENTIAL WITH PLATELET.   Procedure                               Abnormality         Status                     -- Visualized portions of paranasal sinuses are unremarkable. Visualized portions of the mastoid air cells are unremarkable. Visualized portions of the orbits are unremarkable. IMPRESSION: Sequelae of chronic small vessel ischemic disease is noted.  No evidenc

## 2017-03-10 ENCOUNTER — APPOINTMENT (OUTPATIENT)
Dept: ULTRASOUND IMAGING | Facility: HOSPITAL | Age: 72
DRG: 643 | End: 2017-03-10
Attending: HOSPITALIST
Payer: MEDICARE

## 2017-03-10 PROBLEM — Z91.81 AT RISK FOR FALLING: Status: ACTIVE | Noted: 2017-03-10

## 2017-03-10 PROCEDURE — 84100 ASSAY OF PHOSPHORUS: CPT | Performed by: NURSE PRACTITIONER

## 2017-03-10 PROCEDURE — 82024 ASSAY OF ACTH: CPT | Performed by: INTERNAL MEDICINE

## 2017-03-10 PROCEDURE — 85025 COMPLETE CBC W/AUTO DIFF WBC: CPT | Performed by: HOSPITALIST

## 2017-03-10 PROCEDURE — 84550 ASSAY OF BLOOD/URIC ACID: CPT | Performed by: INTERNAL MEDICINE

## 2017-03-10 PROCEDURE — 82533 TOTAL CORTISOL: CPT | Performed by: INTERNAL MEDICINE

## 2017-03-10 PROCEDURE — 76770 US EXAM ABDO BACK WALL COMP: CPT

## 2017-03-10 PROCEDURE — 84133 ASSAY OF URINE POTASSIUM: CPT | Performed by: INTERNAL MEDICINE

## 2017-03-10 PROCEDURE — 83935 ASSAY OF URINE OSMOLALITY: CPT | Performed by: INTERNAL MEDICINE

## 2017-03-10 PROCEDURE — 83930 ASSAY OF BLOOD OSMOLALITY: CPT | Performed by: INTERNAL MEDICINE

## 2017-03-10 PROCEDURE — 80053 COMPREHEN METABOLIC PANEL: CPT | Performed by: NURSE PRACTITIONER

## 2017-03-10 PROCEDURE — 84300 ASSAY OF URINE SODIUM: CPT | Performed by: INTERNAL MEDICINE

## 2017-03-10 PROCEDURE — 82962 GLUCOSE BLOOD TEST: CPT

## 2017-03-10 PROCEDURE — 84132 ASSAY OF SERUM POTASSIUM: CPT | Performed by: NURSE PRACTITIONER

## 2017-03-10 PROCEDURE — 83735 ASSAY OF MAGNESIUM: CPT | Performed by: NURSE PRACTITIONER

## 2017-03-10 PROCEDURE — 84100 ASSAY OF PHOSPHORUS: CPT | Performed by: INTERNAL MEDICINE

## 2017-03-10 RX ORDER — KETOROLAC TROMETHAMINE 30 MG/ML
15 INJECTION, SOLUTION INTRAMUSCULAR; INTRAVENOUS EVERY 6 HOURS PRN
Status: DISCONTINUED | OUTPATIENT
Start: 2017-03-10 | End: 2017-03-11

## 2017-03-10 RX ORDER — COSYNTROPIN 0.25 MG/ML
0.25 INJECTION, POWDER, FOR SOLUTION INTRAMUSCULAR; INTRAVENOUS ONCE
Status: COMPLETED | OUTPATIENT
Start: 2017-03-10 | End: 2017-03-10

## 2017-03-10 RX ORDER — POTASSIUM CHLORIDE 14.9 MG/ML
20 INJECTION INTRAVENOUS ONCE
Status: COMPLETED | OUTPATIENT
Start: 2017-03-10 | End: 2017-03-11

## 2017-03-10 RX ORDER — POTASSIUM CHLORIDE 14.9 MG/ML
20 INJECTION INTRAVENOUS ONCE
Status: COMPLETED | OUTPATIENT
Start: 2017-03-10 | End: 2017-03-10

## 2017-03-10 RX ORDER — KETOROLAC TROMETHAMINE 30 MG/ML
30 INJECTION, SOLUTION INTRAMUSCULAR; INTRAVENOUS EVERY 6 HOURS PRN
Status: DISCONTINUED | OUTPATIENT
Start: 2017-03-10 | End: 2017-03-10 | Stop reason: DRUGHIGH

## 2017-03-10 NOTE — ED NOTES
Pt continues to be restless, grabbing at rt knee ACE bandage and rubbing rt thigh. Pt is unable to express verbally that she is in pain, but her body language appears signal pain at post surgical sight. Pain medication pending.

## 2017-03-10 NOTE — PAYOR COMM NOTE
Attending Physician: Ruby Ferguson MD    Review Type: ADMISSION   Reviewer: Mack Florian       Date: March 10, 2017 - 10:37 AM  Payor: Denise Braga Number: [id number]  Admit date: 3/9/2017  3:38 PM   Admitted from Emergency Dept.: yes    M MD        Patient appears to have pneumonia.  We will start the patient on Rocephin and azithromycin patient's lactic acid is negative.  However with elevated white count and the mental status changes I do feel this likely is the hyponatremia causing some Dose Route User    3/9/2017 1818 New Bag 1000 mL Intravenous Antione HORNE RN      sodium phosphate 30 mmol in dextrose 5 % 100 mL IVPB     Date Action Dose Route User    3/10/2017 0921 New Bag 30 mmol Intravenous Steven Harris RN          RESULTS LA (*)     All other components within normal limits   URIC ACID, SERUM - Abnormal; Notable for the following:     Uric Acid 1.8 (*)     All other components within normal limits   OSMOLALITY, SERUM - Abnormal; Notable for the following:     Osmolality Serum Final result                 Please view results for these tests on the individual orders. SODIUM, URINE, RANDOM   CORTISOL   CBC WITH DIFFERENTIAL WITH PLATELET    Narrative:      The following orders were created for panel order CBC WITH DIFFERENTIAL WI

## 2017-03-10 NOTE — CONSULTS
[facility]  Report of Consultation    Corrinne Gentry Patient Status:  Inpatient    1945 MRN [id number]   [facility] 4SW-A Attending Jono Su MD   Hosp Day # 1 PCP Hugh Linares MD       REASON FOR CONSULT:     Hyp SURGICAL SITE INFECTION PROPHYLAXIS.  N/A 9/22/2015    Comment Procedure: COLONOSCOPY, POSSIBLE BIOPSY, POSSIBLE POLYPECTOMY 61636;  Surgeon: Earnest Marsh MD;  Location: [address] [place] [address]    PATIENT DOCUMENTED NOT TO HAVE EXPERIENCED ANY OF THE Mardene Serene hours) at 03/10/17 0937  Last data filed at 03/10/17 0650   Gross per 24 hour   Intake 1122.8 ml   Output    750 ml   Net  372.8 ml     Wt Readings from Last 3 Encounters:  03/09/17 : 204 lb 5.9 oz (92.7 kg)  03/06/17 : 190 lb (86.183 kg)  02/27/17 : 190 l PROUR Negative 03/09/2017   UROBILINOGEN <2.0 03/09/2017   NITRITE Negative 03/09/2017   LEUUR Negative 03/09/2017   WBCUR 1-4 03/09/2017   RBCUR 0-2 03/09/2017   EPIUR None Seen 03/09/2017   BACUR None Seen 03/09/2017         IMAGING:     All imaging st care of your patient. Please do not hesitate to contact me with concerns or questions.   Critical care time > 35min    Kaelyn Wheeler MD  [address] Nephrology    3/10/2017  9:37 AM

## 2017-03-10 NOTE — H&P
[facility] Hospitalist History and Physical      Patient presents with:  Altered Mental Status (neurologic)  Abdomen/Flank Pain (GI/)       PCP: Amairani Vargas MD      History of Present Illness: Patient is a 74year old female with PMH sig for recent POLYPECTOMY 39048;  Surgeon: Luis Bear MD;  Location: [address] [place] [address]    PATIENT DOCUMENTED NOT TO HAVE EXPERIENCED ANY OF THE FOLLOWING EVENTS N/A 9/22/2015    Comment Procedure: COLONOSCOPY, POSSIBLE BIOPSY, POSSIBLE POLYPECTOMY 37107;  Surge Extremities normal, atraumatic, no cyanosis or edema. Skin: Skin color, texture, turgor normal. No rashes or lesions. Neurologic: Lethargic, moves all four extremities. Intact bilateral hand .       Data Review:    LABS:     Lab Results  Component periventricular white matter are likely sequelae of chronic small vessel ischemic disease. Ventricles and sulci are appropriate for the patient's age. No mass effect. Visualized portions of paranasal sinuses are unremarkable.  Visualized portions of the mas LIVER:  Normal.  No enlargement, atrophy, abnormal density, or significant focal lesion. BILIARY:  Cholecystectomy. PANCREAS:  Normal.  No lesion, fluid collection, ductal dilatation, or atrophy. SPLEEN:  Normal.  No enlargement or focal lesion.   KIDNEY on 2/27/17, HL who was brought to ED by family for mental status changes and abdominal pain. In the ED, CT head negative for acute findings.  CT A/P with right adrenal gland enlargement with left adrenal gland thickening with surrounding inflammatory flores FLORENTINO.

## 2017-03-10 NOTE — ED NOTES
Pt transported to unit by this RN/monitor. Pt was incontinent of urine upon arrival to ICU. Pt has had bladder emptied < than 1 hr earlier of >350 ml urine during straight cath.  Pt's restlessness may be related to her incontinence and receiving RN was info

## 2017-03-10 NOTE — ED NOTES
Pt back from CT, very restless and agitated. Pt's gown is soaked d/t incontinence. Medication ordered for agitation and gown, linen and diaper applied with assist from 3 staff. Vitals remain stable.

## 2017-03-10 NOTE — ED NOTES
Restlessness has returned, pt was medicated with Ativan as ordered. Pt to go to ICU, accompanied by this RN and monitor. Vitals are stable. Rocephin infusing as ordered. zithromax to be hung on floor.

## 2017-03-10 NOTE — ED NOTES
CT called to report pt not able to lie still for scan. Attempt to proceed with scan as best as possible. No new orders at this time.

## 2017-03-10 NOTE — CM/SW NOTE
03/10/17 1600   CM/SW Referral Data   Referral Source Social Work (self-referral)   Reason for Referral Discharge planning;Readmission   Informant Patient   Readmission Assessment   Factors that patient feels contributed to this readmission Other (only following at home. Pt found to have high sodium. Mental status is improving. Work up in progress. Anticipate pt returning home with [facility] at ID. SW to follow.     Shelby Light, 03/10/2017, 5:01 PM

## 2017-03-10 NOTE — PROGRESS NOTES
ICU  Critical Care APN Progress Note    NAME: Alonzo Logan - ROOM: 456/456-A - MRN: [id number] - Age: 74year old - :1945    History Of Present Illness:  Alonzo Logan is a 74year old female with PMHx significant for hld, oa, and just underwent Skin: Skin color, texture, turgor normal for ethnicity, no rashes or lesions, warm and dry  Neurologic: CNII-XII intact, normal strength from limited exam    Data this admission:  PROCEDURE:  CT BRAIN OR HEAD (80391)      COMPARISON:  None.      INDICATION     COMPARISON:  None.      INDICATIONS:  confusion, abdominal pain.      TECHNIQUE:  CT scanning was performed from the dome of the diaphragm to the pubic symphysis with non-ionic intravenous contrast material. Post contrast coronal MIP imaging was perfor LUNG BASES:  Small right pleural effusion measures 9 mm in depth with bilateral lower lobe nonspecific ground glass density.  Small hiatal hernia.      =====  CONCLUSION:     1.  Right adrenal gland enlargement with left adrenal gland thickening with surrou

## 2017-03-10 NOTE — ED NOTES
Report given to Guerrero Kirkpatrick RN. Antibiotics are pending. Pt to floor when cleared by ED MD and intensvist is contacted.

## 2017-03-10 NOTE — ED NOTES
Pt was observed ambulating to exam room from triage area with use of walker, accompanied by spouse. Pt's gait was slow, but steady.  Spouse reports pt was c/o \"abominal pain yesterday, prior to bedtime\", and states her appetite has been less than normal i

## 2017-03-10 NOTE — SLP NOTE
Pt remains drowsy and not appropriate for evaluation at this time per RN report. Will follow up as scheduling allows. Thank you.     Cecilia Charles M.S. [address] [facility]  Pager [phone]

## 2017-03-10 NOTE — CONSULTS
Pulmonary / Critical Care H&P/Consult       NAME: Maria Antonia Healy - ROOM: [address] [place], [place] [address]A - MRN: [id number] - Age: 74year old - :  1945    Date of Admission: 3/9/2017  3:38 PM  Admission Diagnosis: Hyponatremia [E87.1]  Disorientation [R41.0]  [facility] Spouse Name: N/A    Years of Education: N/A  Number of Children: N/A     Occupational History  None on file     Social History Main Topics   Smoking status: Never Smoker     Smokeless tobacco: Never Used    Alcohol Use: Yes    Comment: 2 WEEKLY    Drug Us 116/71 113/54   Pulse: 84 84 99 83   Temp:  98 °F (36.7 °C)     TempSrc:  Temporal     Resp: 21 23 26 21   Height:       Weight:       SpO2: 95% 94% 97% 93%     O2: 2Lnc               Wt Readings from Last 3 Encounters:  03/09/17 : 204 lb 5.9 oz (92.7 kg) ----------  CREATININE (mg/dL)   Date Value   03/10/2017 0.38*   03/09/2017 0.29*   03/09/2017 0.35*   01/10/2017 0.78   ----------]    Recent Labs   Lab  03/09/17   1627  03/09/17   2256  03/10/17   0442   ALT  30  24  23   AST  36  41  31     Pm brenton

## 2017-03-10 NOTE — PLAN OF CARE
Resumed care at [phone], pt confused and restless, pt received a dose of ativan prior to ICU arrival, unable to follow commands, incontinent of urine. Reviewed case with Dr. Valerie Mcmahan.  Dr. Watson johnson, updated with poc and pt condition, morning lab orde

## 2017-03-11 PROCEDURE — 84300 ASSAY OF URINE SODIUM: CPT | Performed by: INTERNAL MEDICINE

## 2017-03-11 PROCEDURE — 02HV33Z INSERTION OF INFUSION DEVICE INTO SUPERIOR VENA CAVA, PERCUTANEOUS APPROACH: ICD-10-PCS | Performed by: HOSPITALIST

## 2017-03-11 PROCEDURE — 85025 COMPLETE CBC W/AUTO DIFF WBC: CPT | Performed by: INTERNAL MEDICINE

## 2017-03-11 PROCEDURE — 97163 PT EVAL HIGH COMPLEX 45 MIN: CPT

## 2017-03-11 PROCEDURE — 76937 US GUIDE VASCULAR ACCESS: CPT | Performed by: NURSE PRACTITIONER

## 2017-03-11 PROCEDURE — 80048 BASIC METABOLIC PNL TOTAL CA: CPT | Performed by: INTERNAL MEDICINE

## 2017-03-11 PROCEDURE — 82570 ASSAY OF URINE CREATININE: CPT | Performed by: INTERNAL MEDICINE

## 2017-03-11 PROCEDURE — 84133 ASSAY OF URINE POTASSIUM: CPT | Performed by: INTERNAL MEDICINE

## 2017-03-11 PROCEDURE — 97530 THERAPEUTIC ACTIVITIES: CPT

## 2017-03-11 PROCEDURE — 83935 ASSAY OF URINE OSMOLALITY: CPT | Performed by: INTERNAL MEDICINE

## 2017-03-11 PROCEDURE — 82436 ASSAY OF URINE CHLORIDE: CPT | Performed by: INTERNAL MEDICINE

## 2017-03-11 PROCEDURE — 87081 CULTURE SCREEN ONLY: CPT | Performed by: HOSPITALIST

## 2017-03-11 PROCEDURE — 83735 ASSAY OF MAGNESIUM: CPT | Performed by: INTERNAL MEDICINE

## 2017-03-11 PROCEDURE — 36569 INSJ PICC 5 YR+ W/O IMAGING: CPT | Performed by: NURSE PRACTITIONER

## 2017-03-11 PROCEDURE — 82962 GLUCOSE BLOOD TEST: CPT

## 2017-03-11 PROCEDURE — B548ZZA ULTRASONOGRAPHY OF SUPERIOR VENA CAVA, GUIDANCE: ICD-10-PCS | Performed by: HOSPITALIST

## 2017-03-11 PROCEDURE — 92610 EVALUATE SWALLOWING FUNCTION: CPT

## 2017-03-11 PROCEDURE — 84100 ASSAY OF PHOSPHORUS: CPT | Performed by: INTERNAL MEDICINE

## 2017-03-11 RX ORDER — SODIUM CHLORIDE 1000 MG
2 TABLET, SOLUBLE MISCELLANEOUS 2 TIMES DAILY WITH MEALS
Status: DISCONTINUED | OUTPATIENT
Start: 2017-03-11 | End: 2017-03-11

## 2017-03-11 RX ORDER — POTASSIUM CHLORIDE 20 MEQ/1
40 TABLET, EXTENDED RELEASE ORAL EVERY 4 HOURS
Status: DISCONTINUED | OUTPATIENT
Start: 2017-03-11 | End: 2017-03-11

## 2017-03-11 RX ORDER — 3% SODIUM CHLORIDE 3 G/100ML
INJECTION, SOLUTION INTRAVENOUS CONTINUOUS
Status: DISCONTINUED | OUTPATIENT
Start: 2017-03-11 | End: 2017-03-13

## 2017-03-11 RX ORDER — SODIUM CHLORIDE 0.9 % (FLUSH) 0.9 %
10 SYRINGE (ML) INJECTION AS NEEDED
Status: DISCONTINUED | OUTPATIENT
Start: 2017-03-11 | End: 2017-03-17

## 2017-03-11 RX ORDER — ENOXAPARIN SODIUM 100 MG/ML
40 INJECTION SUBCUTANEOUS DAILY
Status: DISCONTINUED | OUTPATIENT
Start: 2017-03-12 | End: 2017-03-13

## 2017-03-11 RX ORDER — SODIUM CHLORIDE 1000 MG
2 TABLET, SOLUBLE MISCELLANEOUS ONCE
Status: DISCONTINUED | OUTPATIENT
Start: 2017-03-11 | End: 2017-03-11

## 2017-03-11 NOTE — PROGRESS NOTES
[facility]  Nephrology Progress Note    Alexandra Odonnell Attending:  Alejandra Blank MD     SUBJECTIVE     Confusion improved but still forgetful  Often pulling out her IV if not monitored  Good UOP  Na improving slightly      PHYSICAL EXAM   Vital sig All imaging studies reviewed. CT ap w contrast 3/9/17:   CONCLUSION:     1. Right adrenal gland enlargement with left adrenal gland thickening with surrounding inflammatory changes.  This is a contrast-enhanced CT and underlying hemorrhage cannot be ex

## 2017-03-11 NOTE — CONSULTS
ENDOCRINOLOGY CONSULTATION    Attending physician:  Trevin Sow MD  Consulting physican:  Leo Herrera MD    Admission Date:  3/9/2017  Consultation Date:  3/10/2017      Reason for consultation: Adrenal evaluation    Chief Complaint:  Admitted for Surgeon: Reina Trejo MD;  Location: [address] [place] [address]    PATIENT [address] [place] [address] FOR IV ANTIBIOTIC SURGICAL SITE INFECTION PROPHYLAXIS.  N/A 9/22/2015    Comment Procedure: COLONOSCOPY, POSSIBLE BIOPSY, POSSIBLE POLYPECTOMY 88106;  Surge injection 0.5 mg 0.5 mg Intravenous Once   [COMPLETED] morphINE sulfate (PF) 4 MG/ML injection      [COMPLETED] morphINE sulfate (PF) 4 MG/ML injection 4 mg 4 mg Intravenous Once   [COMPLETED] LORazepam (ATIVAN) injection 1 mg 1 mg Intravenous Once   [COMP Surgery Father      Triple bypass   • Infectious Disease Brother      AIDS   • Thyroid Disorder Mother      Goiter   • Hypertension Mother    • Cancer Other      M.  Aunt- uterine cancer           Review of Systems:    Pertinent positive and negative sympto RBC      3.80-5.10 x10(6)uL 2.92 (L)   Hemoglobin      12.0-16.0 g/dL 8.5 (L)   Hematocrit      34.0-50.0 % 24.7 (L)   Platelet Count      738.5-375.5 10(3)uL 103.0 (L)               Imaging Studies:    From 3/9/2017: CT abdomen showed:    ADRENALS: The

## 2017-03-11 NOTE — PLAN OF CARE
Assumed care of patient at 0700. Throughout the day, patient has become less confused and more alert. Sodium improving. VSS.     CONFUSION    • Confusion, delirium, dementia or psychosis is improved or at baseline Progressing        METABOLIC/FLUID AND ELEC

## 2017-03-11 NOTE — PLAN OF CARE
Assumed care for this patient at 0730: patient disoriented to time. Recent Right knee surgery 2/27. Now admitted with low sodium level 119. Sodium level 125 this am. IV fluids stopped per renal. BMP Q6h. Call renal with results.  Gonzales due to urinary retent

## 2017-03-11 NOTE — PHYSICAL THERAPY NOTE
PHYSICAL THERAPY EVALUATION - INPATIENT     Room Number: 456/456-A  Evaluation Date: 3/11/2017  Type of Evaluation: Initial  Physician Order: PT Eval and Treat    Presenting Problem: hyponatremia with recent TKA 2/27  Reason for Therapy: Mobility Dysfu SITUATION  Type of Home: House   Home Layout: Two level  Stairs to Enter : 2  Railing: No  Stairs to Bedroom: 14  Railing: Yes    Lives With: Spouse  Drives: No  Patient Owned Equipment: Rolling walker  Patient Regularly Uses: Glasses    Prior Level of Ind -   Moving from lying on back to sitting on the side of the bed?: None   How much help from another person does the patient currently need. ..   -   Moving to and from a bed to a chair (including a wheelchair)?: A Little   -   Need to walk in [facility] presents with the following impairments: balance, activity tolerance, post op weakness and poor safety awareness due to cognitive deficits at this time.   These impairments manifest themselves as functional limitations in bed mobility, transfers, gait and a

## 2017-03-11 NOTE — PROGRESS NOTES
ENDOCRINOLOGY PROGRESS NOTE    Typed by Ofe Le MD on 3/11/2017      S:  Pt sleeping but arousable. Still confused at times. Pt's  is at the bedside.         O:  /76 mmHg  Pulse 92  Temp(Src) 98.6 °F (37 °C) (Temporal)  Resp 21 enlargement measures 5.1 x 3.2 cm with diffuse left adrenal gland thickening. There is infiltration of the adjacent fat.  Infiltration also surrounds the right kidney and extends into the right paracolic gutter    Right adrenal gland enlargement with left a

## 2017-03-11 NOTE — SLP NOTE
ADULT SWALLOWING EVALUATION    ASSESSMENT & PLAN   ASSESSMENT  Orders were received for a bedside swallowing evaluation as patient demonstrated difficulty on the initial dysphagia screen.  Since the initial screen, patient is demonstrating improved alertnes Regular; Thin liquids  Dysphagia History:   No past history of dysphagia  Imaging Results:  CXR: Right lower lobe consolidation. CT of Head: Sequelae of chronic small vessel ischemic disease is noted.  No evidence of intracranial hemorrhage or extra-axial f

## 2017-03-11 NOTE — PROGRESS NOTES
[facility] Hospitalist Progress Note                                                                   [address]  2/1/1945    CC: F/U AMS, Hyponatremia    SUBJECTIVE:    Patient seen this AM, 03/11/17   0025  03/11/17   0559  03/11/17   1147   PGLU  91  131*  100*  97  94       Meds:   Scheduled:    Continuous Infusions:    PRN: ketorolac (TORADOL) injection **OR** [DISCONTINUED] ketorolac (TORADOL) injection, acetaminophen    Assessment/Plan:

## 2017-03-11 NOTE — PLAN OF CARE
CONFUSION    • Confusion, delirium, dementia or psychosis is improved or at baseline Not Progressing        METABOLIC/FLUID AND ELECTROLYTES - ADULT    • Electrolytes maintained within normal limits Not Progressing          METABOLIC/FLUID AND ELECTROLYTES

## 2017-03-11 NOTE — PROGRESS NOTES
[facility] PULMONARY/CRITICAL CARE    S: Still confused, but less then yesterday.     Meds:  • sodium phosphate IVPB  30 mmol Intravenous Once   • potassium chloride 40mEq IVPB (peripheral line)  40 mEq Intravenous Once       Prn Meds:  ketorolac (TORADOL) injecti >  124*  125*  124*   K  3.9  3.8  3.6   < >  3.5*  3.6  3.6   CL  87*  91*  93*   < >  94*  94*  92*   CO2  22.0  19.0*  22.0   < >  20.0*  20.0*  22.0   ALKPHO  76  66  61   --    --    --    --    AST  36  41  31   --    --    --    --    ALT  30  24  2

## 2017-03-12 PROBLEM — D72.829 LEUKOCYTOSIS, UNSPECIFIED TYPE: Chronic | Status: ACTIVE | Noted: 2017-03-09

## 2017-03-12 PROCEDURE — 84100 ASSAY OF PHOSPHORUS: CPT | Performed by: INTERNAL MEDICINE

## 2017-03-12 PROCEDURE — 82962 GLUCOSE BLOOD TEST: CPT

## 2017-03-12 PROCEDURE — 85025 COMPLETE CBC W/AUTO DIFF WBC: CPT | Performed by: HOSPITALIST

## 2017-03-12 PROCEDURE — 84300 ASSAY OF URINE SODIUM: CPT | Performed by: INTERNAL MEDICINE

## 2017-03-12 PROCEDURE — 82570 ASSAY OF URINE CREATININE: CPT | Performed by: INTERNAL MEDICINE

## 2017-03-12 PROCEDURE — 80048 BASIC METABOLIC PNL TOTAL CA: CPT | Performed by: INTERNAL MEDICINE

## 2017-03-12 PROCEDURE — 84133 ASSAY OF URINE POTASSIUM: CPT | Performed by: INTERNAL MEDICINE

## 2017-03-12 PROCEDURE — 83935 ASSAY OF URINE OSMOLALITY: CPT | Performed by: INTERNAL MEDICINE

## 2017-03-12 RX ORDER — POTASSIUM CHLORIDE 14.9 MG/ML
20 INJECTION INTRAVENOUS ONCE
Status: COMPLETED | OUTPATIENT
Start: 2017-03-12 | End: 2017-03-12

## 2017-03-12 RX ORDER — DOCUSATE SODIUM 100 MG/1
100 CAPSULE, LIQUID FILLED ORAL 2 TIMES DAILY
Status: DISCONTINUED | OUTPATIENT
Start: 2017-03-12 | End: 2017-03-21

## 2017-03-12 RX ORDER — 3% SODIUM CHLORIDE 3 G/100ML
INJECTION, SOLUTION INTRAVENOUS CONTINUOUS
Status: DISCONTINUED | OUTPATIENT
Start: 2017-03-12 | End: 2017-03-13

## 2017-03-12 RX ORDER — 3% SODIUM CHLORIDE 3 G/100ML
INJECTION, SOLUTION INTRAVENOUS CONTINUOUS
Status: DISCONTINUED | OUTPATIENT
Start: 2017-03-12 | End: 2017-03-12

## 2017-03-12 RX ORDER — 3% SODIUM CHLORIDE 3 G/100ML
INJECTION, SOLUTION INTRAVENOUS ONCE
Status: COMPLETED | OUTPATIENT
Start: 2017-03-12 | End: 2017-03-12

## 2017-03-12 NOTE — PLAN OF CARE
CONFUSION    • Confusion, delirium, dementia or psychosis is improved or at baseline Not Progressing        METABOLIC/FLUID AND ELECTROLYTES - ADULT    • Electrolytes maintained within normal limits Not Progressing    • Hemodynamic stability and optimal re

## 2017-03-12 NOTE — PROGRESS NOTES
Patient transferred back to ICU for hyponatremia. Upon arrival consent from  received and PICC line placed without complications. Hypertonic saline per Renal, appreciate recommendations. Discussed case with [facility]-on call Dr. James Montes.

## 2017-03-12 NOTE — PROGRESS NOTES
[facility] Hospitalist Progress Note                                                                   [address]  2/1/1945    CC: F/U AMS, Hyponatremia    SUBJECTIVE:    Overnight patient tra in this interval not displayed.        Recent Labs   Lab  03/09/17   1627  03/09/17   2256  03/10/17   0442   ALT  30  24  23   AST  36  41  31   ALB  3.0*  2.6*  2.4*       Recent Labs   Lab  03/11/17   0025  03/11/17   0559  03/11/17   1147  03/11/17   20 admission  -will continue to monitor      S/P right TKA 2/27  -was discharged on prophylactic xarelto  -now on prophylactic lovenox       Prophylaxis:   DVT with SCD's, lovenox      Mary Ann Cruz MD  [facility] Hospitalist  Pager: [phone]

## 2017-03-12 NOTE — PLAN OF CARE
Results for Dorcas Barone (MRN [id number]) as of 3/12/2017 16:32   Ref. Range 3/12/2017 16:00   Glucose Latest Ref Range: 70-99 mg/dL 86   Sodium Latest Ref Range: 136-144 mmol/L 118 (LL)   Results to nephrology, waiting for orders.  Hypertonic gtt orders

## 2017-03-12 NOTE — PROGRESS NOTES
Spoke with Dr London Lazaro, Requests patient be transferred to ICU, for central line placement and closer monitoring due to critical sodium.  Andrea Keller notified of transfer.

## 2017-03-12 NOTE — PROGRESS NOTES
Assumed pt early evening. AOx3 , following command. Denied pain/ discomfort. No cough or productive .

## 2017-03-12 NOTE — PLAN OF CARE
Received pt this am with hypertonic saline infusing. Per night RN next BMP to be drawn at 10am, 3hrs post start of transfusion. Skip 8am per renal instructions.

## 2017-03-12 NOTE — PROGRESS NOTES
[facility] Patient Status:  Inpatient    1945 MRN [id number]   [facility] 4SW-A Attending Delmar Harrison MD   Hosp Day # 3 PCP Scott Bonner MD     Critical Care Progress Note     Date of Admission: 3/ Results  Component Value Date   INR 1.29* 03/09/2017   INR 1.00 02/07/2017            Assessment and Plan      1. Encephalopathy: suspect from hyponatremia. Ct head negative, UA not indicative of infection. No evidence of pneumonia.    - no diff with protec

## 2017-03-12 NOTE — PLAN OF CARE
Received pt this am ao/self. Pt occasionally remembers she is in hospital sometimes thinks good jack/ or edward/ or at home. Easily reoriented. Attempting to pull at medical equipment and get OOB; bilateral wrist restraints, bed alarm, frequent rounding.  Hy

## 2017-03-12 NOTE — PROGRESS NOTES
[facility]  Nephrology Progress Note    Lupis Hewitt Attending:  Estephania Earl MD     SUBJECTIVE     Sodium dropped to 119 overnight and increased confusion. Transferred back to ICU and started on hypertonic saline.    Confused improved from last left adrenal gland thickening with surrounding inflammatory changes. This is a contrast-enhanced CT and underlying hemorrhage cannot be excluded to suggest Benji's disease, correlate clinically.   2. Diverticular disease without evidence for acute diverti MD  3/11/2017  10:24 AM  [facility] Nephrology

## 2017-03-13 ENCOUNTER — APPOINTMENT (OUTPATIENT)
Dept: ULTRASOUND IMAGING | Facility: HOSPITAL | Age: 72
DRG: 643 | End: 2017-03-13
Attending: NURSE PRACTITIONER
Payer: MEDICARE

## 2017-03-13 ENCOUNTER — APPOINTMENT (OUTPATIENT)
Dept: CT IMAGING | Facility: HOSPITAL | Age: 72
DRG: 643 | End: 2017-03-13
Attending: HOSPITALIST
Payer: MEDICARE

## 2017-03-13 PROCEDURE — 84295 ASSAY OF SERUM SODIUM: CPT | Performed by: HOSPITALIST

## 2017-03-13 PROCEDURE — 85378 FIBRIN DEGRADE SEMIQUANT: CPT | Performed by: INTERNAL MEDICINE

## 2017-03-13 PROCEDURE — 86022 PLATELET ANTIBODIES: CPT | Performed by: INTERNAL MEDICINE

## 2017-03-13 PROCEDURE — 85610 PROTHROMBIN TIME: CPT | Performed by: INTERNAL MEDICINE

## 2017-03-13 PROCEDURE — 84295 ASSAY OF SERUM SODIUM: CPT | Performed by: INTERNAL MEDICINE

## 2017-03-13 PROCEDURE — 36593 DECLOT VASCULAR DEVICE: CPT

## 2017-03-13 PROCEDURE — 85730 THROMBOPLASTIN TIME PARTIAL: CPT | Performed by: NURSE PRACTITIONER

## 2017-03-13 PROCEDURE — 85025 COMPLETE CBC W/AUTO DIFF WBC: CPT | Performed by: HOSPITALIST

## 2017-03-13 PROCEDURE — 85730 THROMBOPLASTIN TIME PARTIAL: CPT | Performed by: INTERNAL MEDICINE

## 2017-03-13 PROCEDURE — C9121 INJECTION, ARGATROBAN: HCPCS | Performed by: NURSE PRACTITIONER

## 2017-03-13 PROCEDURE — 82962 GLUCOSE BLOOD TEST: CPT

## 2017-03-13 PROCEDURE — 84300 ASSAY OF URINE SODIUM: CPT | Performed by: HOSPITALIST

## 2017-03-13 PROCEDURE — 97535 SELF CARE MNGMENT TRAINING: CPT

## 2017-03-13 PROCEDURE — 80076 HEPATIC FUNCTION PANEL: CPT | Performed by: INTERNAL MEDICINE

## 2017-03-13 PROCEDURE — 71260 CT THORAX DX C+: CPT

## 2017-03-13 PROCEDURE — 85384 FIBRINOGEN ACTIVITY: CPT | Performed by: INTERNAL MEDICINE

## 2017-03-13 PROCEDURE — 97162 PT EVAL MOD COMPLEX 30 MIN: CPT

## 2017-03-13 PROCEDURE — 84100 ASSAY OF PHOSPHORUS: CPT | Performed by: HOSPITALIST

## 2017-03-13 PROCEDURE — 80048 BASIC METABOLIC PNL TOTAL CA: CPT | Performed by: INTERNAL MEDICINE

## 2017-03-13 PROCEDURE — 97530 THERAPEUTIC ACTIVITIES: CPT

## 2017-03-13 PROCEDURE — 93971 EXTREMITY STUDY: CPT

## 2017-03-13 PROCEDURE — 97166 OT EVAL MOD COMPLEX 45 MIN: CPT

## 2017-03-13 PROCEDURE — 70450 CT HEAD/BRAIN W/O DYE: CPT

## 2017-03-13 PROCEDURE — A4216 STERILE WATER/SALINE, 10 ML: HCPCS | Performed by: NURSE PRACTITIONER

## 2017-03-13 RX ORDER — SODIUM CHLORIDE 1000 MG
2 TABLET, SOLUBLE MISCELLANEOUS
Status: DISCONTINUED | OUTPATIENT
Start: 2017-03-13 | End: 2017-03-15

## 2017-03-13 RX ORDER — ARGATROBAN 1 MG/ML
0.2 INJECTION, SOLUTION INTRAVENOUS CONTINUOUS
Status: DISCONTINUED | OUTPATIENT
Start: 2017-03-13 | End: 2017-03-17

## 2017-03-13 RX ORDER — ONDANSETRON 2 MG/ML
4 INJECTION INTRAMUSCULAR; INTRAVENOUS EVERY 6 HOURS PRN
Status: DISCONTINUED | OUTPATIENT
Start: 2017-03-13 | End: 2017-03-21

## 2017-03-13 RX ORDER — 3% SODIUM CHLORIDE 3 G/100ML
100 INJECTION, SOLUTION INTRAVENOUS ONCE
Status: COMPLETED | OUTPATIENT
Start: 2017-03-13 | End: 2017-03-13

## 2017-03-13 NOTE — OCCUPATIONAL THERAPY NOTE
OCCUPATIONAL THERAPY EVALUATION - INPATIENT     Room Number: 468/468-A  Evaluation Date: 3/13/2017  Type of Evaluation: Initial  Presenting Problem: Hyponatremia;  Altered mental status; recent TKR 2/27/17    Physician Order: IP Consult to Occupational Ther HOME SITUATION  Type of Home: House  Home Layout: Two level  Lives With: Spouse    Toilet and Equipment: 3-in-1 commode  Shower/Tub and Equipment: Walk-in shower; Shower chair       Occupation/Status: Retired  Hand Dominance: Right  Drives: No  Patien Strength Left: 15#  Dynamometer  Strength Right: 45#      NEUROLOGICAL FINDINGS  Neurological Findings: Coordination - Finger to Nose  Coordination - Finger to Nose: Symmetrical      ACTIVITY TOLERANCE  O2 Saturation: 95%  Room air   BP: supine: 133/75 insight, decreased balance, endurance, decreased strength, decreased motor planning and coordination. Dynamometer  Strength testing demonstrates patient is below standard  strength bilaterally and there is a significant  weakness on the left.

## 2017-03-13 NOTE — PROGRESS NOTES
[facility] Hospitalist Progress Note                                                                   [address]  2/1/1945    CC: F/U AMS, Hyponatremia    SUBJECTIVE:    Continues to have con No results for input(s): ALT, AST, ALB, AMYLASE, LIPASE, LDH in the last 72 hours.     Invalid input(s): ALPHOS, TBIL, DBIL, TPROT    Recent Labs   Lab  03/12/17   0758  03/12/17   1115  03/12/17   1633  03/12/17   2102  03/13/17   0900   PGLU  88  83 hemoglobin 12.1 post-op  -Currently with some component of dilution  -Continue to monitor  -Transfuse for hemoglobin <7    Thrombocytopenia  -133 on admit, declining  -d/c enoxaparin  -HIT/HARI sent  -started on argatroban      S/P right TKA 2/27  -was disc

## 2017-03-13 NOTE — PROGRESS NOTES
Operative leg measured for tubigrip as previous one was missing. Applied to right leg. Patient and  instructed and verbalized understanding. RN informed.

## 2017-03-13 NOTE — PROGRESS NOTES
Pulmonary Progress Note        NAME: Fredy Hastings - ROOM: 468/468-A - MRN: [id number] - Age: 74year old - : 1945        SUBJECTIVE: No events overnight, remains confused    OBJECTIVE:   17  0800 17  0900 17  1000 17  1100 7. 7* 7.7* 8.1*   MG  --   --  2.0  --   --   --   --   --   --   --   --    PHOS 2.6  --  1.6*  --  1.9*  --   --   --   --  2.7  --    < > = values in this interval not displayed.       Recent Labs   03/13/17  0714   ALT 19   AST 30   ALB 1.9*       Invali diet as tolerated  7.  Dispo: full code  -d/w   Kenton Reid  [facility] Pulmonary and Critical Care

## 2017-03-13 NOTE — PLAN OF CARE
CONFUSION    • Confusion, delirium, dementia or psychosis is improved or at baseline Not Progressing          METABOLIC/FLUID AND ELECTROLYTES - ADULT    • Glucose maintained within prescribed range Progressing    • Electrolytes maintained within normal li

## 2017-03-13 NOTE — PROGRESS NOTES
[address] [facility]  Nephrology Progress Note    Maria Antonia Healy Attending:  Clif Long MD       SUBJECTIVE:     Thinks it is October 2004 or 2006.   Able to state her name and she is in the hospital.  Says her L hand fingers are numb --> tingling after wr LYPERCENT 11.5 03/13/2017   MOPERCENT 7.5 03/13/2017   EOPERCENT 1.1 03/13/2017   BAPERCENT 0.2 03/13/2017   NE 7.54* 03/13/2017   LYMABS 1.13 03/13/2017   MOABSO 0.74* 03/13/2017   EOABSO 0.11 03/13/2017   BAABSO 0.02 03/13/2017       Lab Results  Triangle give tolvaptan due to mental status  -- increase 3% saline to 40 ml/hr  -- KCl IV 40 meq x 1 to maintain K>4  -- high protein diet  -- fluid restrict <1L/d  -- repeat serum Na, urine Na, urine osm at noon, pending results will consider small dose lasix

## 2017-03-13 NOTE — PHYSICAL THERAPY NOTE
PHYSICAL THERAPY RE-EVALUATION - INPATIENT     Room Number: 468/468-A  Evaluation Date: 3/13/2017  Type of Evaluation: Initial  Physician Order: PT Eval and Treat    Presenting Problem: return to ICU on 3/11/17 due to hyponatremia. s/p TKA 2/27/17. Comment ON 2/27/2017: right knee replacement       HOME SITUATION  Type of Home: House   Home Layout: Two level  Stairs to Enter : 2  Railing: No  Stairs to Bedroom: 14  Railing: Yes    Lives With: Spouse  Drives: No  Patient Owned Equipment: Eli Hernandez ACTIVITY TOLERANCE  O2 Saturation: >92%  Room air  No shortness of breath  Heart Rate: 90's  Blood Pressure: 763/71    AM-PAC '6-Clicks' INPATIENT SHORT FORM - BASIC MOBILITY  How much difficulty does the patient currently have. ..  -   Turning over in recommendations, role of inpt PT, DC recommendation, assessment findings, goals and expectations. Functional activity tolerated  Transfer training  hep issued.     Patient End of Session: Up in chair;Needs met;Call light within reach;RN aware of jackeline

## 2017-03-13 NOTE — PLAN OF CARE
Received pt this am alert, pleasant but confused. Bilateral wrist restraints to keep pt from pulling equiipment, wearing posey vest to keep pt in bed, also bed alarm on and frequent rounding/reorientation.      Pt on hypertonic saline infusion upon receipt

## 2017-03-13 NOTE — PAYOR COMM NOTE
Attending Physician: Roman Neil MD    Review Type: CONTINUED STAY  Reviewer: Viktor Major     Date: March 13, 2017 - 10:18 AM  Payor: Denise Young Omi Number: [id number]  Admit date: 3/9/2017  3:38 PM        Progress Notes by Roman Neil   < >   3.6   3.6   3.8   4.0   3.9   3.7    CL   93*    < >   94*    < >   94*   92*   88*   87*   88*   88*    CO2   22.0    < >   19.0*    < >   20.0*   22.0   20.0*   23.0   23.0   23.0    BUN   6*    < >   7*    < >   6*   4*   6*   6*   5*   5*    CR euvolemic on exam, hypotonic  -Renal consulted apreciate  -Possibly 2/2 SIADH due to pain/nausea.  Also concern for possible adrenal insufficiency, PM cortisol last night WNL but possibly inappropriately normal  -CT A/P with right adrenal gland enlargement Dose Route User    3/13/2017 0956 New Bag 40 mEq Intravenous Josh Alicia RN      3% NaCl (hypertonic) infusion 40 ml/hr     Date Action Dose Route User    3/13/2017 0809 Rate/Dose Change (none) Intravenous Josh Alicia RN    3/13/2017 05

## 2017-03-13 NOTE — PHYSICAL THERAPY NOTE
Pt seen by inpt PT on 3/11/17 just prior to t/f to the floor. Pt however, t/f back to ICU on 3/11/7 at 22:31 due to hyponatremia and encephalopathy. Pt will need new orders to resume inpt PT. Spoke with Anh Wilkinson, who will get new order.

## 2017-03-13 NOTE — PLAN OF CARE
Pt c/o tingling in hand. Also, per noc RN some swelling observed near picc area along with bruising. US L arm ordered; APN aware.

## 2017-03-14 PROCEDURE — 80069 RENAL FUNCTION PANEL: CPT | Performed by: INTERNAL MEDICINE

## 2017-03-14 PROCEDURE — 85730 THROMBOPLASTIN TIME PARTIAL: CPT | Performed by: NURSE PRACTITIONER

## 2017-03-14 PROCEDURE — 84295 ASSAY OF SERUM SODIUM: CPT | Performed by: INTERNAL MEDICINE

## 2017-03-14 PROCEDURE — 85730 THROMBOPLASTIN TIME PARTIAL: CPT | Performed by: HOSPITALIST

## 2017-03-14 PROCEDURE — 97110 THERAPEUTIC EXERCISES: CPT

## 2017-03-14 PROCEDURE — 82962 GLUCOSE BLOOD TEST: CPT

## 2017-03-14 PROCEDURE — 97116 GAIT TRAINING THERAPY: CPT

## 2017-03-14 PROCEDURE — C9121 INJECTION, ARGATROBAN: HCPCS | Performed by: HOSPITALIST

## 2017-03-14 PROCEDURE — 84300 ASSAY OF URINE SODIUM: CPT | Performed by: INTERNAL MEDICINE

## 2017-03-14 PROCEDURE — 85025 COMPLETE CBC W/AUTO DIFF WBC: CPT | Performed by: HOSPITALIST

## 2017-03-14 PROCEDURE — 97530 THERAPEUTIC ACTIVITIES: CPT

## 2017-03-14 PROCEDURE — 83935 ASSAY OF URINE OSMOLALITY: CPT | Performed by: INTERNAL MEDICINE

## 2017-03-14 RX ORDER — SODIUM CHLORIDE 1000 MG
2 TABLET, SOLUBLE MISCELLANEOUS ONCE
Status: COMPLETED | OUTPATIENT
Start: 2017-03-14 | End: 2017-03-14

## 2017-03-14 RX ORDER — FUROSEMIDE 10 MG/ML
10 INJECTION INTRAMUSCULAR; INTRAVENOUS ONCE
Status: COMPLETED | OUTPATIENT
Start: 2017-03-14 | End: 2017-03-14

## 2017-03-14 NOTE — PROGRESS NOTES
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        PTT   Date Value Ref Range Status   03/14/2017 68.6* 25.0-34.0 seconds Final   ----------  INR   Date Value Ref Range Stat

## 2017-03-14 NOTE — OCCUPATIONAL THERAPY NOTE
OCCUPATIONAL THERAPY TREATMENT NOTE - INPATIENT     Room Number: 468/468-A  Session: 1   Number of Visits to Meet Established Goals: 7    Presenting Problem: Hyponatremia;  Altered mental status; recent TKR 2/27/17         History related to current admissi Procedure: COLONOSCOPY, POSSIBLE BIOPSY, POSSIBLE POLYPECTOMY 01415;  Surgeon: Johanna Galvan MD;  Location: [address] [place] [address] ON 2/27/2017: right knee replacement       SUBJECTIVE  \"I don't know where I am.\" chair with min assist and min cues for RW use and hand placement. Pt was educated on and performed AROM exercises for B UE's in all major planes with min cues for proper technique and form x10 reps each. Pt was educated on safety precautions.  Pt was left i eval/education; Neuromuscluar reeducation; Fine motor coordination activities; Compensatory technique education  Rehab Potential : Good  Frequency (Obs): 5x/week      OT Goals:     ADL Goals  Patient will perform grooming: with supervision and while standing

## 2017-03-14 NOTE — PROGRESS NOTES
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   The current inf

## 2017-03-14 NOTE — CONSULTS
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   Pharmacy to manage per protocol for a goal PTT range of 46 to 93 (1.5-3 melly

## 2017-03-14 NOTE — PROGRESS NOTES
[facility] Hospitalist Progress Note                                                                   [address]  2/1/1945    CC: F/U AMS, Hyponatremia    SUBJECTIVE:    Transferred out of IC --    --   3.3   GLU  80   < >  96  89  80  83   --    --    --    --    --   86  86    < > = values in this interval not displayed.        Recent Labs   Lab  03/13/17   0714  03/14/17   0530   ALT  19   --    AST  30   --    ALB  1.9*  1.9*       Recent La hemoglobin 12.1 post-op  -Currently with some component of dilution  -Continue to monitor  -Transfuse for hemoglobin <7    Thrombocytopenia  -133 on admit, declining  -d/c enoxaparin  -HIT/HARI sent  -started on argatroban      S/P right TKA 2/27  -was disc

## 2017-03-14 NOTE — PROGRESS NOTES
Pharmacy Dosing Service: Madeleine Whelan is a 74year old female on argatroban for a left cephalic clot s/p TKR in the setting of possible HIT (HIPA pending).   The argatroban infusion has been shut off since this morning @ 0929, following a

## 2017-03-14 NOTE — PROGRESS NOTES
[facility] Patient Status:  Inpatient    1945 MRN [id number]   [facility] 4SW-A Attending Alejandra Blank MD   Hosp Day # 5 PCP Therese Estrada MD     Critical Care Progress Note     Date of Admission: 3/ Results  Component Value Date   WBC 6.6 03/14/2017   RBC 3.28 03/14/2017   HGB 9.4 03/14/2017   HCT 27.1 03/14/2017   MCV 82.6 03/14/2017   MCH 28.7 03/14/2017   MCHC 34.7 03/14/2017   RDW 12.9 03/14/2017   PLT 71.0 03/14/2017       Lab Results  Component

## 2017-03-14 NOTE — PROGRESS NOTES
[facility] Dosing Service  Argatroban Subsequent Dosing       Steffen Ardon is a 74year old female on argatroban for a suspected HIT.        PTT   Date Value Ref Range Status   03/13/2017 58.2* 25.0-34.0 seconds Final   ----------  INR   Date Value Ref

## 2017-03-14 NOTE — PROGRESS NOTES
[address] [facility]  Nephrology Progress Note    Efra Ferreira Attending:  Emma Torrez MD       SUBJECTIVE:     Started argatroban yesterday, found to have L cephalic clot. Patient up in a chair, reading newspaper, no complaints.  Had stitches removed fr 03/14/2017   PLT 71.0* 03/14/2017   MCV 82.6 03/14/2017   MCH 28.7 03/14/2017   MCHC 34.7 03/14/2017   RDW 12.9 03/14/2017   NEPRELIM 4.57 03/14/2017   NEPERCENT 69.2 03/14/2017   LYPERCENT 15.5 03/14/2017   MOPERCENT 9.6 03/14/2017   EOPERCENT 1.7 03/14/2 thought to be sufficient, CXR 3/9 with CT a/p 3/9 showed R adrenal enlargement and L adrenal thickening, no clear lung involvement. CT brain with small vessel changes. SIADH may be due to post op/nausea state.     Na increased to 124 meq/L from 117 meq/L ye

## 2017-03-14 NOTE — PROGRESS NOTES
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 PTT   Date Value Ref Range Status   03/14/2017 78.

## 2017-03-14 NOTE — PROGRESS NOTES
Admitted for hyponatremia and confusion. Knows who I am, the fact that she in a hospital this am, what procedure she recently had done. No severe pain.     Temp:  [97.8 °F (36.6 °C)-98.1 °F (36.7 °C)] 97.9 °F (36.6 °C)  Pulse:  [] 87  Resp:  [14-25]

## 2017-03-14 NOTE — PHYSICAL THERAPY NOTE
PHYSICAL THERAPY TREATMENT NOTE - INPATIENT    Room Number: 468/468-A     Session: 1   Number of Visits to Meet Established Goals: 5    Presenting Problem: return to ICU on 3/11/17 due to hyponatremia. s/p TKA 2/27/17.      Problem List  Principal Problem None                PAIN ASSESSMENT   Ratin  Location: Denies       BALANCE                                                                                                                     Static Sitting: Good  Dynamic Sitting: Fair -           Stat assist for safety. Pt needed cues for RW management during turns. During session, pt was able to recall month/year/date of her surgery but shows delayed reactions. Pt was left in chair,  in room and RN aware of session.      THERAPEUTIC EXERCISES  Lo

## 2017-03-14 NOTE — PLAN OF CARE
CONFUSION    • Confusion, delirium, dementia or psychosis is improved or at baseline Progressing        METABOLIC/FLUID AND ELECTROLYTES - ADULT    • Glucose maintained within prescribed range Progressing    • Electrolytes maintained within normal limits P

## 2017-03-14 NOTE — PROGRESS NOTES
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        PTT   Date Value Ref Range Status   03/14/2017 98.9* 25.0-34.0 seconds Final   ----------  INR   Date Value Ref Range Stat

## 2017-03-15 PROCEDURE — C9121 INJECTION, ARGATROBAN: HCPCS | Performed by: HOSPITALIST

## 2017-03-15 PROCEDURE — 85730 THROMBOPLASTIN TIME PARTIAL: CPT | Performed by: NURSE PRACTITIONER

## 2017-03-15 PROCEDURE — 97110 THERAPEUTIC EXERCISES: CPT

## 2017-03-15 PROCEDURE — 82962 GLUCOSE BLOOD TEST: CPT

## 2017-03-15 PROCEDURE — 84295 ASSAY OF SERUM SODIUM: CPT | Performed by: INTERNAL MEDICINE

## 2017-03-15 PROCEDURE — 84300 ASSAY OF URINE SODIUM: CPT | Performed by: INTERNAL MEDICINE

## 2017-03-15 PROCEDURE — 97535 SELF CARE MNGMENT TRAINING: CPT

## 2017-03-15 PROCEDURE — 97116 GAIT TRAINING THERAPY: CPT

## 2017-03-15 PROCEDURE — 83935 ASSAY OF URINE OSMOLALITY: CPT | Performed by: INTERNAL MEDICINE

## 2017-03-15 PROCEDURE — 80069 RENAL FUNCTION PANEL: CPT | Performed by: INTERNAL MEDICINE

## 2017-03-15 RX ORDER — FUROSEMIDE 10 MG/ML
20 INJECTION INTRAMUSCULAR; INTRAVENOUS ONCE
Status: COMPLETED | OUTPATIENT
Start: 2017-03-15 | End: 2017-03-15

## 2017-03-15 RX ORDER — SODIUM CHLORIDE 1000 MG
1 TABLET, SOLUBLE MISCELLANEOUS ONCE
Status: DISCONTINUED | OUTPATIENT
Start: 2017-03-15 | End: 2017-03-15

## 2017-03-15 RX ORDER — SODIUM CHLORIDE 1000 MG
3 TABLET, SOLUBLE MISCELLANEOUS
Status: DISCONTINUED | OUTPATIENT
Start: 2017-03-15 | End: 2017-03-15 | Stop reason: SDUPTHER

## 2017-03-15 RX ORDER — SODIUM CHLORIDE 1000 MG
3 TABLET, SOLUBLE MISCELLANEOUS
Status: DISCONTINUED | OUTPATIENT
Start: 2017-03-15 | End: 2017-03-16

## 2017-03-15 RX ORDER — SODIUM CHLORIDE 1000 MG
3 TABLET, SOLUBLE MISCELLANEOUS ONCE
Status: COMPLETED | OUTPATIENT
Start: 2017-03-15 | End: 2017-03-15

## 2017-03-15 NOTE — PROGRESS NOTES
[address] [place]  Nephrology Progress Note    Lupis Hewitt Attending:  Estephania Earl MD       SUBJECTIVE:     Patient's  states her mental status is the same as yesterday.   Was net neg 1.9 L with IV lasix yesterday, urine osm decreased to 300s s MCHC 34.7 03/14/2017   RDW 12.9 03/14/2017   NEPRELIM 4.57 03/14/2017   NEPERCENT 69.2 03/14/2017   LYPERCENT 15.5 03/14/2017   MOPERCENT 9.6 03/14/2017   EOPERCENT 1.7 03/14/2017   BAPERCENT 0.2 03/14/2017   NE 4.57 03/14/2017   LYMABS 1.02 03/14/2017 showed R adrenal enlargement and L adrenal thickening, no clear lung involvement. CT brain with small vessel changes. SIADH may be due to post op/nausea state. Na remain stable 121-123 mg/dl on fluid restriction/salt tab/lasix.     Will try escalated dos

## 2017-03-15 NOTE — PAYOR COMM NOTE
Attending Physician: Kleber Huston MD    Review Type: CONTINUED STAY  Reviewer: Essence Pa     Date: March 15, 2017 - 1:39 PM  Payor: MarinaTasha Braga Number: [id number]  Admit date: 3/9/2017  3:38 PM        REVIEWER COMMENTS    3-14-17: 3 g Oral Jodine Bloodgood D, RN      sodium chloride tab 3 g     Date Action Dose Route User    3/15/2017 1304 Given 3 g Oral Maralelaurita Lloyd RN          Na remain stable 121-123 mg/dl on fluid restriction/salt tab/lasix.     Will try escalated doses of sal

## 2017-03-15 NOTE — PLAN OF CARE
CONFUSION    • Confusion, delirium, dementia or psychosis is improved or at baseline Progressing        Impaired Functional Mobility    • Achieve highest/safest level of mobility/gait Progressing        METABOLIC/FLUID AND ELECTROLYTES - ADULT    • Glucose

## 2017-03-15 NOTE — CM/SW NOTE
DANNA spoke with patient's , Carrie Champagne ([phone]) to discuss discharge plans. DANNA and Hieu discussed recommended 24 hour supervision. Carrie Champagne had many concerns about providing 24 hour car to patient including, \"what if I have to go to the store?

## 2017-03-15 NOTE — PROGRESS NOTES
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        PTT   Date Value Ref Range Status

## 2017-03-15 NOTE — OCCUPATIONAL THERAPY NOTE
OCCUPATIONAL THERAPY TREATMENT NOTE - INPATIENT     Room Number: 942/837-D  Session: 2   Number of Visits to Meet Established Goals: 7    Presenting Problem: Hyponatremia;  Altered mental status; recent TKR 2/27/17    History related to current admission: P home.    OBJECTIVE  Precautions: Bed/chair alarm;Limb alert - left; Other (Comment) (R TKR 17 )    WEIGHT BEARING RESTRICTION  Weight Bearing Restriction: None                PAIN ASSESSMENT  Ratin  Location: N/A        ACTIVITY TOLERANCE  O2 Satur TKR a few weeks ago. Pt is requiring increased assistance with LE dressing and standing balance. Current limitations: decreased standing balance and standing endurance. Pt will benefit from skilled OT services to maximize independence with ADLs.        O

## 2017-03-15 NOTE — PHYSICAL THERAPY NOTE
PHYSICAL THERAPY TREATMENT NOTE - INPATIENT    Room Number: 582    Session: 2   Number of Visits to Meet Established Goals: 5    Presenting Problem: return to ICU on 3/11/17 due to hyponatremia. s/p TKA 2/27/17.      Problem List  Principal Problem:    Hy pt denies        BALANCE                                                                                                                     Static Sitting: Good  Dynamic Sitting: Fair -           Static Standing: Fair -  Dynamic Standing: Poor +    ACTIVI Pt left in BS chair c alarm pad on . RN and PCT aware.              THERAPEUTIC EXERCISES  Lower Extremity Alternating marching  Ankle pumps  Hip AB/AD  Heel slides  LAQ  Leg slides        Upper Extremity      Position Sitting     Repetitions   10-20   Sets

## 2017-03-15 NOTE — PLAN OF CARE
CONFUSION    • Confusion, delirium, dementia or psychosis is improved or at baseline Not Progressing          PAIN - ADULT    • Verbalizes/displays adequate comfort level or patient's stated pain goal Progressing        Patient/Family Goals    • Patient/Fa

## 2017-03-15 NOTE — CONSULTS
[facility]  Report of Consultation    Jose Brown Patient Status:  Inpatient    1945 MRN [id number]   [facility] 5NW-A Attending Lee Smallwood MD   Hosp Day # 6 PCP Rosa M Anne MD     REASON FOR CONSULTATION: History   Problem Relation Age of Onset   • Heart Disorder Father      CHF   • Other[Other] [OTHER] Mother      DEMENTIA   • Diabetes Father    • Heart Surgery Father      Triple bypass   • Infectious Disease Brother      AIDS   • Thyroid Disorder Mother edema. Neurological: Grossly intact.           DIAGNOSTIC WORK UP:     Laboratory Data:      Lab Results  Component Value Date   CREATSERUM 0.48 03/15/2017   BUN 6 03/15/2017    03/15/2017   K 4.3 03/15/2017   CL 90 03/15/2017   CO2 23.0 03/15/2017 pneumonia     At risk for falling      1. S/p Knee surgery   2. SIADH   3.  Thrombocytopenia   This is a recent development post knee surgery   This could be post op as well as related to HIPA and HIT  She is on argatroban  No evidence of any thrombosis   A

## 2017-03-15 NOTE — PROGRESS NOTES
[facility] Hospitalist Progress Note                                                                   [address]  2/1/1945    CC: F/U AMS, Hyponatremia    SUBJECTIVE:    Patient seen in after ALB  1.9*  1.9*  2.1*       Recent Labs   Lab  03/14/17   1630  03/14/17   2034  03/15/17   0758  03/15/17   1205  03/15/17   1650   PGLU  95  115*  94  94  135*       Meds:   Scheduled:   • sodium chloride  3 g Oral TID CC   • alteplase (CATHFLO) IV pus consulted    S/P right TKA 2/27  -was discharged on prophylactic xarelto  -started on prophylactic lovenox, but held due to TCP    LUE swelling  -Duplex negative for DVT, nonocclusive thrombus within proximal to left basilic vein      Prophylaxis:   DVT wi

## 2017-03-15 NOTE — PROGRESS NOTES
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        PTT   Date Value Ref Range Status   03/15/2017 74.5* 25.0-34.0 seconds Final   ----------  INR   Date Value Re

## 2017-03-16 PROCEDURE — 97110 THERAPEUTIC EXERCISES: CPT

## 2017-03-16 PROCEDURE — 85027 COMPLETE CBC AUTOMATED: CPT | Performed by: INTERNAL MEDICINE

## 2017-03-16 PROCEDURE — 97116 GAIT TRAINING THERAPY: CPT

## 2017-03-16 PROCEDURE — 82962 GLUCOSE BLOOD TEST: CPT

## 2017-03-16 PROCEDURE — 97530 THERAPEUTIC ACTIVITIES: CPT

## 2017-03-16 PROCEDURE — 80069 RENAL FUNCTION PANEL: CPT | Performed by: INTERNAL MEDICINE

## 2017-03-16 PROCEDURE — 84295 ASSAY OF SERUM SODIUM: CPT | Performed by: INTERNAL MEDICINE

## 2017-03-16 PROCEDURE — 85025 COMPLETE CBC W/AUTO DIFF WBC: CPT | Performed by: HOSPITALIST

## 2017-03-16 PROCEDURE — 85730 THROMBOPLASTIN TIME PARTIAL: CPT | Performed by: HOSPITALIST

## 2017-03-16 RX ORDER — TOLVAPTAN 15 MG/1
15 TABLET ORAL DAILY
Status: DISCONTINUED | OUTPATIENT
Start: 2017-03-16 | End: 2017-03-17

## 2017-03-16 RX ORDER — POTASSIUM CHLORIDE 20 MEQ/1
40 TABLET, EXTENDED RELEASE ORAL ONCE
Status: COMPLETED | OUTPATIENT
Start: 2017-03-16 | End: 2017-03-16

## 2017-03-16 NOTE — PHYSICAL THERAPY NOTE
PHYSICAL THERAPY TREATMENT NOTE - INPATIENT    Room Number: 019    Session: 3  Number of Visits to Meet Established Goals: 5    Presenting Problem: return to ICU on 3/11/17 due to hyponatremia. s/p TKA 2/27/17.      Problem List  Principal Problem:    Hyp None                PAIN ASSESSMENT   Ratin  Location: pt denies        BALANCE                                                                                                                     Static Sitting: Good  Dynamic Sitting: Fair - motor planning and problem solving. After seated rest, pt required CGA to stand from [facility] chair, and performed standing therex c CGA and RW for bal. Pt returned to [facility] chair, alarm set, table placed in front, breakfast tray arriving.  Pt educated on safe mobil independent      Goal #4  Asc/madi 11 steps with railing and supervision.    Goal #5  Compliance with activity recommendations.      Goal #6      Goal Comments: Goals established on 3/13/2017. Ongoing 3/16/17.

## 2017-03-16 NOTE — PROGRESS NOTES
[facility]  Nephrology Progress Note    Bernarda Romero Attending:  Maggy Anne MD       SUBJECTIVE:      thinks patient is still a lil foggy in her thinking but she endorses thirst and per nursing patient is able to drink to thirst.  No cp, NEPRELIM 4.57 03/14/2017   NEPERCENT 69.2 03/14/2017   LYPERCENT 15.5 03/14/2017   MOPERCENT 9.6 03/14/2017   EOPERCENT 1.7 03/14/2017   BAPERCENT 0.2 03/14/2017   NE 4.57 03/14/2017   LYMABS 1.02 03/14/2017   MOABSO 0.63* 03/14/2017   EOABSO 0.11 03/14/ tolvaptan today.     SIADH:  -- stop fluid restriction, NaCl tabs, and lasix  -- start tolvaptan 15 mg x 1 today, goal Na in 24 hours ~130 mmol/L  -- check BMP in 4-5 hours  -- allow patient to drink to thirst, have water available to patient at bedside

## 2017-03-16 NOTE — PAYOR COMM NOTE
Attending Physician: Tabatha Farrell MD    Review Type: CONTINUED STAY  Reviewer: Cheryl Grewal     Date: March 16, 2017 - 1:23 PM  Payor: Denise Braga Number: [id number]  Admit date: 3/9/2017  3:38 PM        REVIEWER COMMENTS:  PLEASE FAX 1109 Given 40 mEq Oral Cydne Saad, CRISTINA      sodium chloride tab 3 g     Date Action Dose Route User    3/16/2017 0847 Given 3 g Oral Cydnlaurita Nash RN    3/15/2017 1610 Given 3 g Oral Cydne Saad, RN      tolvaptan [facility]) tab 15 mg     Date

## 2017-03-16 NOTE — PLAN OF CARE
CONFUSION    • Confusion, delirium, dementia or psychosis is improved or at baseline Progressing        METABOLIC/FLUID AND ELECTROLYTES - ADULT    • Glucose maintained within prescribed range Progressing        PAIN - ADULT    • Verbalizes/displays adequa

## 2017-03-16 NOTE — CONSULTS
120 [facility] Dosing Service  Argatroban Subsequent Dosing       Ethel Gibson is a 74year old female on argatroban for HIPA positive.       PTT   Date Value Ref Range Status   03/16/2017 54.4* 25.0-34.0 seconds Final   ----------  INR   Date Value Ref Ra

## 2017-03-16 NOTE — PROGRESS NOTES
[facility]  Progress Note    Alonzo Logan Patient Status:  Inpatient    1945 MRN [id number]   [facility] 5NW-A Attending Ina Ramirez MD   Hosp Day # 7 PCP Idania Dawson MD     Subjective:    She is better today  Amb

## 2017-03-17 PROCEDURE — 82607 VITAMIN B-12: CPT | Performed by: OTHER

## 2017-03-17 PROCEDURE — 83090 ASSAY OF HOMOCYSTEINE: CPT | Performed by: OTHER

## 2017-03-17 PROCEDURE — 82140 ASSAY OF AMMONIA: CPT | Performed by: OTHER

## 2017-03-17 PROCEDURE — 95819 EEG AWAKE AND ASLEEP: CPT

## 2017-03-17 PROCEDURE — 84439 ASSAY OF FREE THYROXINE: CPT | Performed by: OTHER

## 2017-03-17 PROCEDURE — 97535 SELF CARE MNGMENT TRAINING: CPT

## 2017-03-17 PROCEDURE — 85730 THROMBOPLASTIN TIME PARTIAL: CPT | Performed by: HOSPITALIST

## 2017-03-17 PROCEDURE — 84132 ASSAY OF SERUM POTASSIUM: CPT | Performed by: HOSPITALIST

## 2017-03-17 PROCEDURE — 83735 ASSAY OF MAGNESIUM: CPT | Performed by: OTHER

## 2017-03-17 PROCEDURE — 82962 GLUCOSE BLOOD TEST: CPT

## 2017-03-17 PROCEDURE — 84295 ASSAY OF SERUM SODIUM: CPT | Performed by: INTERNAL MEDICINE

## 2017-03-17 PROCEDURE — 85025 COMPLETE CBC W/AUTO DIFF WBC: CPT | Performed by: HOSPITALIST

## 2017-03-17 PROCEDURE — 84443 ASSAY THYROID STIM HORMONE: CPT | Performed by: OTHER

## 2017-03-17 PROCEDURE — 97530 THERAPEUTIC ACTIVITIES: CPT

## 2017-03-17 PROCEDURE — 97116 GAIT TRAINING THERAPY: CPT

## 2017-03-17 PROCEDURE — 80069 RENAL FUNCTION PANEL: CPT | Performed by: INTERNAL MEDICINE

## 2017-03-17 RX ORDER — TOLVAPTAN 30 MG/1
30 TABLET ORAL DAILY
Status: DISCONTINUED | OUTPATIENT
Start: 2017-03-18 | End: 2017-03-21

## 2017-03-17 NOTE — OCCUPATIONAL THERAPY NOTE
OCCUPATIONAL THERAPY TREATMENT NOTE - INPATIENT     Room Number: 468/468-A  Session: 3   Number of Visits to Meet Established Goals: 7    Presenting Problem: Hyponatremia;  Altered mental status; recent TKR 2/27/17         History related to current admissi Procedure: COLONOSCOPY, POSSIBLE BIOPSY, POSSIBLE POLYPECTOMY 47237;  Surgeon: Itz Jasmine MD;  Location: [address] [place] [address] ON 2/27/2017: right knee replacement       SUBJECTIVE  \"I went on a walk to the ha to wash her hands and comb her hair standing at the sink with supervision. Pt performed functional mobility with RW and with min assist. Pt performed standing>sit in chair with min assist and min cues for RW use and hand placement.  Pt was educated on safet education;Patient/Family training;Equipment eval/education; Neuromuscluar reeducation; Fine motor coordination activities; Compensatory technique education  Rehab Potential : Good  Frequency (Obs): 5x/week      OT Goals:     ADL Goals  Patient will perform gr

## 2017-03-17 NOTE — PLAN OF CARE
Patient is alert x4, but intermit confusion.    argatraban gtt stopped  Continue to monitor na levels  Encourage drinking water when thirsty  Refusing scds at this time, will reattempt  Missing patient glasses, appropriate people contacted  Neurology consul Hyponatremia     Disorientation     Leukocytosis, unspecified type     Community acquired pneumonia     At risk for falling       Active issue(s) requiring resolution prior to discharge: confusion    Anticipated discharge date: 3/18/2017     Current discha

## 2017-03-17 NOTE — PLAN OF CARE
CONFUSION    • Confusion, delirium, dementia or psychosis is improved or at baseline Progressing        Impaired Activities of Daily Living    • Achieve highest/safest level of independence in self care Progressing        Impaired Functional Mobility    •

## 2017-03-17 NOTE — PHYSICAL THERAPY NOTE
PHYSICAL THERAPY TREATMENT NOTE - INPATIENT    Room Number: 215    Session: 4  Number of Visits to Meet Established Goals: 5    Presenting Problem: return to ICU on 3/11/17 due to hyponatremia. s/p TKA 2/27/17.      Problem List  Principal Problem:    Hyp 0  Location: pt denies        BALANCE                                                                                                                     Static Sitting: Good  Dynamic Sitting: Fair -           Static Standing: Fair -  Dynamic Standing:  Lakia Chand Position Sitting     Repetitions   10   Sets   1     Patient End of Session: Up in chair;Needs met;Call light within reach;RN aware of session/findings; All patient questions and concerns addressed; Alarm set; Family present    ASSESSMENT     Patient is a 7

## 2017-03-17 NOTE — PROGRESS NOTES
[facility]  Progress Note    Lupis Hewitt Patient Status:  Inpatient    1945 MRN [id number]   [facility] 5NW-A Attending Gael Pizano MD   Hosp Day # 8 PCP Nancy Mercer MD     Subjective:    She is better today  No

## 2017-03-17 NOTE — PROGRESS NOTES
[address] [place]  Nephrology Progress Note    Kody Johnston Attending:  Tabatha Farrell MD       SUBJECTIVE:     Patient states she is thirsty. Drinking to thirst appropriately per patient. No cp or sob. No leg swelling.   Has no issues emptying bladder or 83.3 03/17/2017   MCH 28.4 03/17/2017   MCHC 34.1 03/17/2017   RDW 13.2 03/17/2017   NEPRELIM 3.84 03/17/2017   NEPERCENT 63.4 03/17/2017   LYPERCENT 18.8 03/17/2017   MOPERCENT 11.1 03/17/2017   EOPERCENT 2.1 03/17/2017   BAPERCENT 0.8 03/17/2017   NE 3.8 improved to 130 mmol/L. Continues to seem inattentive and confused despite improvement in sodium levels.     SIADH:  -- continue tolvaptan 15 mg/d  -- repeat Na level at noon, 6 pm  -- NO FLUID RESTRICTION, encourage patient to drink to thirst and have wate

## 2017-03-17 NOTE — PROGRESS NOTES
[facility] Hospitalist Progress Note                                                                   [facility] CONY Lucas  2/1/1945    CC: F/U AMS, Hyponatremia    SUBJECTIVE:  Pt reporting \"I fell g Intravenous Once   • docusate sodium  100 mg Oral BID     Continuous Infusions:   • Argatroban 0.2 mcg/kg/min (03/16/17 0014)     PRN: ondansetron HCl, Normal Saline Flush, acetaminophen    Assessment/Plan:  Principal Problem:    Hyponatremia  Active Probl proximal to left basilic vein    Prophylaxis:   DVT with SCD's.      Aroldo Loco MD  [facility] IM Hospitalist  Pager: [phone]

## 2017-03-17 NOTE — CONSULTS
[facility]    PATIENT'S NAME: Dorcas Barone   [facility] PHYSIC[facility] Ada: Michael Person M.D.    PATIENT ACCOUNT#:   [de-identified]    LOCATION:  [address] [place] [address]  MEDICAL RECORD #:   [id number]       DATE OF BIRTH:  [birth year] allergies. SOCIAL HISTORY:  She lives with her . She has never smoked, not using alcohol daily. FAMILY HISTORY:  Heart disease, dementia, diabetes, thyroid disorder, AIDS, and cancer.     REVIEW OF SYSTEMS:  The patient denies headaches and de study and it showed no seizure activity. RECOMMENDATIONS:  Other metabolic etiologies should be ruled out. We will check her UA to rule out UTI and also check magnesium, B12, ammonia, homocysteine, and TSH.   If her metabolic evaluation is normal, als

## 2017-03-17 NOTE — PROGRESS NOTES
[facility] Hospitalist Progress Note                                                                   [facility] CONY Lucas  2/1/1945    CC: F/U AMS, Hyponatremia    SUBJECTIVE:  Pt reporting \"I fell g Oral BID     Continuous Infusions:      PRN: ondansetron HCl, Normal Saline Flush, acetaminophen    Assessment/Plan:  Principal Problem:    Hyponatremia  Active Problems:    Disorientation    Leukocytosis, unspecified type    Community acquired pneumonia thrombocytopenia/HIT    LUE swelling  -Duplex negative for DVT, nonocclusive thrombus within proximal to left basilic vein    Prophylaxis:   DVT with SCD's. Dispo: plan for dc home tomorrow if ok with neurology.  Family aware of this plan and is concern

## 2017-03-17 NOTE — PROGRESS NOTES
03/17/17 0300   Provider Notification   Reason for Communication Review case   Provider Name Dr. Kellee Moyer of Communication Page   Response Phone call;See orders   Notification Time 0300   Dr. Elvira Lizama notified that patient pulled out PICC line, n

## 2017-03-18 PROCEDURE — 80069 RENAL FUNCTION PANEL: CPT | Performed by: INTERNAL MEDICINE

## 2017-03-18 PROCEDURE — 82962 GLUCOSE BLOOD TEST: CPT

## 2017-03-18 PROCEDURE — 84295 ASSAY OF SERUM SODIUM: CPT | Performed by: INTERNAL MEDICINE

## 2017-03-18 RX ORDER — TOLVAPTAN 30 MG/1
30 TABLET ORAL DAILY
Qty: 30 TABLET | Refills: 0 | Status: SHIPPED | OUTPATIENT
Start: 2017-03-18 | End: 2017-04-05

## 2017-03-18 NOTE — PROGRESS NOTES
[facility] Hospitalist Progress Note                                                                   [address]  2/1/1945    CC: F/U AMS, Hyponatremia    SUBJECTIVE:  Pt feels ok.  Reviewed l docusate sodium  100 mg Oral BID     Continuous Infusions:      PRN: ondansetron HCl, acetaminophen    Assessment/Plan:  Principal Problem:    Hyponatremia  Active Problems:    Disorientation    Leukocytosis, unspecified type    Community acquired pneumoni hematology.  SCDs ordered    S/P right TKA 2/27  -was discharged on prophylactic xarelto  -pt started on prophylactic lovenox, but held due to thrombocytopenia/HIT    LUE swelling  -Duplex negative for DVT, nonocclusive thrombus within proximal to left basi

## 2017-03-18 NOTE — CM/SW NOTE
MSW spoke to patient, spouse and son in room. Discussed post d/c needs. Family does not want to go to Rehab at this time and wants to go home. MSW reviewed PT Rec for 24 hr supervision and home PT. [facility] active with [facility] .  Family state

## 2017-03-18 NOTE — CM/SW NOTE
Received a call from Karrie Brown, [facility] regarding medication Tolvaptan (Samsca) 30 mg daily. CM spoke with both [facility] and [facility] and this medication is not available locally; it must be special ordered. Spoke with Oumou Ferguson @ [facility] [phone].

## 2017-03-18 NOTE — PROGRESS NOTES
[facility]  Nephrology Progress Note    Ethan Pringle Attending: Kate Dinh MD       SUBJECTIVE:     More alert this morning. No leg swelling. Got tolvaptan 30 mg this morning, does not sense an increase in UOP.   She continues to drink CREUR 69.30 03/12/2017   MALBCRECALC 8.7 01/10/2017       Lab Results  Component Value Date   COLORUR Yellow 03/09/2017   CLARITY Hazy* 03/09/2017   SPECGRAVITY 1.013 03/09/2017   GLUUR Negative 03/09/2017   BILUR Negative 03/09/2017   KETUR 20 * 03/09/2

## 2017-03-18 NOTE — PROGRESS NOTES
Neuro Progress Note     SUBJECTIVE:  Interval History: none. .     Medications:    Current Facility-Administered Medications:  tolvaptan (SAMSCA) tab 30 mg 30 mg Oral Daily   alteplase (ACTIVASE) 2 mg in Sterile Water for Injection 2.2 mL IV push 2 mg Intra

## 2017-03-18 NOTE — PLAN OF CARE
Received patient a/ox2, pleasant and cooperative. No new complaints. Patient and spouse updated on plan of care. Spouse verbalizes understanding. Vital signs stable. Will continue to monitor.     CONFUSION    • Confusion, delirium, dementia or psychosis is

## 2017-03-18 NOTE — PROCEDURES
[address]      PATIENT'S NAME: Kiersten Gomez   ATTENDING PHYSICIAN: Omi Renteria M.D.    PATIENT ACCOUNT #: [de-identified] LOCATION: [address] [place] [address]   MEDICAL RECORD #: [id number] DATE OF BIRTH:

## 2017-03-19 PROCEDURE — 85027 COMPLETE CBC AUTOMATED: CPT | Performed by: INTERNAL MEDICINE

## 2017-03-19 PROCEDURE — 83735 ASSAY OF MAGNESIUM: CPT | Performed by: INTERNAL MEDICINE

## 2017-03-19 PROCEDURE — 80069 RENAL FUNCTION PANEL: CPT | Performed by: INTERNAL MEDICINE

## 2017-03-19 NOTE — PROGRESS NOTES
Neuro Progress Note     SUBJECTIVE:  Interval History: has complaints she says she has been better, no headache, eating . Freddy Rosenberg      Medications:    Current Facility-Administered Medications:  tolvaptan (SAMSCA) tab 30 mg 30 mg Oral Daily   alteplase (ACTIVASE)

## 2017-03-19 NOTE — PLAN OF CARE
Assumed care of patient at 1600. Pt up in chair, visiting with family, A&Ox3, VSS, denies pain.  Fall precautions maintained, pt up to bathroom with walker and standby assist.

## 2017-03-19 NOTE — PROGRESS NOTES
[facility]  Nephrology Progress Note    Maria Antonia Healy Attending: Nereyda Encinas MD       SUBJECTIVE:     Feeling much better. Walking up/down stairs yesterday. Still in hospital because pending authorization for tolvaptan with insurance. 03/17/2017   BAPERCENT 0.8 03/17/2017   NE 3.84 03/17/2017   LYMABS 1.14 03/17/2017   MOABSO 0.67* 03/17/2017   EOABSO 0.13 03/17/2017   BAABSO 0.05 03/17/2017       Lab Results  Component Value Date   MALBP 0.82 01/10/2017   CREUR 69.30 03/12/2017   MALBC

## 2017-03-19 NOTE — PROGRESS NOTES
[facility] Hospitalist Progress Note                                                                   [facility] CONY Lucas  2/1/1945    CC: F/U AMS, Hyponatremia    SUBJECTIVE:  Pt feels well, sitting line  2 mg Intravenous Once   • docusate sodium  100 mg Oral BID     Continuous Infusions:      PRN: ondansetron HCl, acetaminophen    Assessment/Plan:  Principal Problem:    Hyponatremia  Active Problems:    Disorientation    Leukocytosis, unspecified typ ordered    S/P right TKA 2/27  -was discharged on prophylactic xarelto  -pt started on prophylactic lovenox, but held due to thrombocytopenia/HIT    LUE swelling  -Duplex negative for DVT, nonocclusive thrombus within proximal to left basilic vein    Proph

## 2017-03-20 PROCEDURE — 97110 THERAPEUTIC EXERCISES: CPT

## 2017-03-20 PROCEDURE — 97116 GAIT TRAINING THERAPY: CPT

## 2017-03-20 PROCEDURE — 80069 RENAL FUNCTION PANEL: CPT | Performed by: INTERNAL MEDICINE

## 2017-03-20 NOTE — PHYSICAL THERAPY NOTE
PHYSICAL THERAPY TREATMENT NOTE - INPATIENT    Room Number: 573    Session: 5  Number of Visits to Meet Established Goals: 5    Presenting Problem: return to ICU on 3/11/17 due to hyponatremia. s/p TKA 2/27/17.      Problem List  Principal Problem:    Hyp pt denies        BALANCE                                                                                                                     Static Sitting: Good  Dynamic Sitting: Fair           Static Standing: Fair -  Dynamic Standing: Fair -    ACTIVITY marching  Ankle pumps  Hip AB/AD  Heel raises  Heel slides  LAQ  hip ext, mini squats, knee flexion in standing        Upper Extremity      Position Sitting & Standing     Repetitions   10-20   Sets   1     Patient End of Session: Up in chair;Needs met; [place]

## 2017-03-20 NOTE — PLAN OF CARE
Confused at night. Bed exit alarm on. Has gotten out of bed twice. Confused by alarm, states \"that noise will wake dad up\". Placed back in bed, bed exit alarm engaged, television turned to show patient likes.   Voices concern regarding waking father u

## 2017-03-20 NOTE — PLAN OF CARE
CONFUSION    • Confusion, delirium, dementia or psychosis is improved or at baseline Progressing        METABOLIC/FLUID AND ELECTROLYTES - ADULT    • Electrolytes maintained within normal limits Progressing        Patient/Family Goals    • Patient/Family L

## 2017-03-20 NOTE — CM/SW NOTE
DANNA notified by RN that Dr Maegan Chamberlain (ortho) does not want home PT for pt, but rather outpt PT. DANNA called [facility] liaison to cancel home care. Pt/spouse aware. / to remain available for support and/or discharge planning.

## 2017-03-20 NOTE — PROGRESS NOTES
[facility]  Nephrology Progress Note    Alexandra Odonnell Attending: Livan Baca MD       SUBJECTIVE:     Feeling much better. Walking the halls   Pending authorization for tolvaptan with insurance.   Notices that she is peeing a lot     PHYSI BAPERCENT 0.8 03/17/2017   NE 3.84 03/17/2017   LYMABS 1.14 03/17/2017   MOABSO 0.67* 03/17/2017   EOABSO 0.13 03/17/2017   BAABSO 0.05 03/17/2017       Lab Results  Component Value Date   MALBP 0.82 01/10/2017   CREUR 69.30 03/12/2017   MALBCRECALC 8.7

## 2017-03-20 NOTE — PLAN OF CARE
Assumed care for this patient at 0730; patient alert, confused at times. Denies pain. Vitals stable.  today. On tolvapatan 30mg daily. Waiting for insurance authorization. Possible discharge today.        CONFUSION    • Confusion, delirium, dementia o

## 2017-03-20 NOTE — CM/SW NOTE
Follow up for drug auth:  Received call from renal, Dr Nas Cantrell who states he has completed the insurance Prior Auth for:  Tolvaptan ([employer]) 30 mg PO qAM #14, 1 refill.     CM spoke to [employer] who states they are NOT able to provid

## 2017-03-21 VITALS
TEMPERATURE: 98 F | RESPIRATION RATE: 18 BRPM | SYSTOLIC BLOOD PRESSURE: 100 MMHG | HEART RATE: 87 BPM | HEIGHT: 67 IN | BODY MASS INDEX: 29.9 KG/M2 | OXYGEN SATURATION: 98 % | DIASTOLIC BLOOD PRESSURE: 41 MMHG | WEIGHT: 190.5 LBS

## 2017-03-21 PROCEDURE — 85027 COMPLETE CBC AUTOMATED: CPT | Performed by: HOSPITALIST

## 2017-03-21 PROCEDURE — 80069 RENAL FUNCTION PANEL: CPT | Performed by: INTERNAL MEDICINE

## 2017-03-21 NOTE — PROGRESS NOTES
NURSING DISCHARGE NOTE    Discharged Home via Wheelchair. Accompanied by Spouse and Support staff  Belongings Taken by patient/family. Patient discharged home with .  All discharge instructions, medications, and follow up care discussed with

## 2017-03-21 NOTE — PROGRESS NOTES
[facility]  Nephrology Progress Note    Alexandra Odonnell Attending: Livan Baca MD       SUBJECTIVE:   Late entry     Feeling much better.   Walking the halls   tolvaptan approved, trying to find rx with it in [address] that she is peeing 03/17/2017   BAPERCENT 0.8 03/17/2017   NE 3.84 03/17/2017   LYMABS 1.14 03/17/2017   MOABSO 0.67* 03/17/2017   EOABSO 0.13 03/17/2017   BAABSO 0.05 03/17/2017       Lab Results  Component Value Date   MALBP 0.82 01/10/2017   CREUR 69.30 03/12/2017   MALBC

## 2017-03-21 NOTE — CM/SW NOTE
[address] [place] [address] will be delivering the Tolvaptan tomorrow am between 6-10am, phone [phone]  Pt and  aware.  provided credit card payment to [address]  over the phone.     Zeke Fontaine RN/CM, MSU  [phone]

## 2017-03-21 NOTE — DISCHARGE SUMMARY
Patient ID:  Geovanna Katz  74year old  2/1/1945    Admit date: 3/9/2017    Discharge date and time: 3/21/2017.      Attending Physician: Ismael Lozano     Primary Care Physician: Norma Allen MD     Reason for admission: Patient p Disorientation    Leukocytosis, unspecified type    Community acquired pneumonia    At risk for falling      Encephalopathy : acute metabolic  -Etiology likely 2/2 hyponatremia  -CT head negative for acute pathology on admit and on repeat imaging 3/13  -In PHYSICAL THERAPY  IP CONSULT TO SOCIAL WORK  IP CONSULT TO HEMATOLOGY  IP CONSULT TO NEUROLOGY    Operative Procedures:       Disposition: home    Patient Instructions:   Current Discharge Medication List    START taking these medications    tolvaptan 30 M

## 2017-03-21 NOTE — PAYOR COMM NOTE
Attending Physician: Patric Frances    Review Type: CONTINUED STAY  Reviewer: Rajwinder HANNA     Date: March 21, 2017 - 10:50 AM  Payor: Denise Braga Number: [id number]  Admit date: 3/9/2017  3:38 PM        REVIEWER COMMENTS

## 2017-03-21 NOTE — PROGRESS NOTES
[facility] Hospitalist Progress Note     PCP: All Eugene MD    SUBJECTIVE:  D/w Dr Stacey Schaefer yesterday-> tolvptan approved  Pending home delivery of tolvaptan  SW discussing w [employer] to dsicuss overnighting medication  Discharge pending medica 10.0*  9.9*   MCV  86.6  86.0   PLT  121.0*  156.0       Recent Labs   Lab  03/18/17   1525  03/19/17   0746  03/20/17   0658  03/21/17   0539   NA  131*  133*  134*  135*   K   --   4.8  4.2  4.7   CL   --   99*  101  102   CO2   --   26.0  24.0  26.0 concern for possible adrenal insufficiency, endo consulted and stim test done, clinical picture thought not be consistent with AI.   -CT A/P with right adrenal gland enlargement with left adrenal gland thickening with surrounding inflammatory changes  -3%

## 2017-03-22 NOTE — CM/SW NOTE
Patient discharged on 3/21/17 as previously planned.        03/22/17 0800   Discharge disposition   Discharged to: Home or Self   Name of Magdalena Camacho services after discharge None   Discharge transportation Private car

## 2017-03-25 DIAGNOSIS — E87.1 HYPONATREMIA: Primary | ICD-10-CM

## 2017-03-27 ENCOUNTER — LAB ENCOUNTER (OUTPATIENT)
Dept: LAB | Age: 72
End: 2017-03-27
Attending: INTERNAL MEDICINE
Payer: MEDICARE

## 2017-03-27 DIAGNOSIS — E87.1 HYPOSMOLALITY SYNDROME: Primary | ICD-10-CM

## 2017-03-27 LAB
BUN BLD-MCNC: 12 MG/DL (ref 8–20)
CALCIUM BLD-MCNC: 10.1 MG/DL (ref 8.3–10.3)
CHLORIDE: 101 MMOL/L (ref 101–111)
CO2: 22 MMOL/L (ref 22–32)
CREAT BLD-MCNC: 1.33 MG/DL (ref 0.55–1.02)
CREAT UR-SCNC: 48.4 MG/DL
GLUCOSE BLD-MCNC: 89 MG/DL (ref 70–99)
K URINE RANDOM: 17.7 MMOL/L
OSMOLALITY URINE: 121 MOSM/KG (ref 300–1300)
POTASSIUM SERPL-SCNC: 4.7 MMOL/L (ref 3.6–5.1)
SODIUM SERPL-SCNC: 133 MMOL/L (ref 136–144)
SODIUM SERPL-SCNC: 17 MMOL/L

## 2017-03-27 PROCEDURE — 84300 ASSAY OF URINE SODIUM: CPT

## 2017-03-27 PROCEDURE — 80048 BASIC METABOLIC PNL TOTAL CA: CPT

## 2017-03-27 PROCEDURE — 84133 ASSAY OF URINE POTASSIUM: CPT

## 2017-03-27 PROCEDURE — 82570 ASSAY OF URINE CREATININE: CPT

## 2017-03-27 PROCEDURE — 83935 ASSAY OF URINE OSMOLALITY: CPT

## 2017-03-28 NOTE — PAYOR COMM NOTE
Review Type: CONTINUED STAY  Reviewer: Elizabeth James     Date: March 28, 2017 - 10:28 AM  Payor: Denise Braga Number: [id number]  Admit date: 3/9/2017  3:38 PM        REVIEWER COMMENTS    Admit date: 3/9/2017    Discharge date and time

## 2017-06-22 PROCEDURE — 82043 UR ALBUMIN QUANTITATIVE: CPT | Performed by: INTERNAL MEDICINE

## 2017-06-22 PROCEDURE — 82436 ASSAY OF URINE CHLORIDE: CPT | Performed by: INTERNAL MEDICINE

## 2017-06-22 PROCEDURE — 84300 ASSAY OF URINE SODIUM: CPT | Performed by: INTERNAL MEDICINE

## 2017-06-22 PROCEDURE — 84133 ASSAY OF URINE POTASSIUM: CPT | Performed by: INTERNAL MEDICINE

## 2017-06-22 PROCEDURE — 82570 ASSAY OF URINE CREATININE: CPT | Performed by: INTERNAL MEDICINE

## 2017-06-23 ENCOUNTER — HOSPITAL ENCOUNTER (EMERGENCY)
Facility: HOSPITAL | Age: 72
Discharge: HOME | End: 2017-06-23
Attending: EMERGENCY MEDICINE
Payer: MEDICARE

## 2017-06-23 VITALS
HEART RATE: 82 BPM | HEIGHT: 67 IN | BODY MASS INDEX: 25.58 KG/M2 | RESPIRATION RATE: 16 BRPM | WEIGHT: 163 LBS | TEMPERATURE: 98 F | OXYGEN SATURATION: 100 % | DIASTOLIC BLOOD PRESSURE: 59 MMHG | SYSTOLIC BLOOD PRESSURE: 100 MMHG

## 2017-06-23 DIAGNOSIS — R53.1 WEAKNESS GENERALIZED: ICD-10-CM

## 2017-06-23 DIAGNOSIS — I95.9 HYPOTENSION, UNSPECIFIED HYPOTENSION TYPE: Primary | ICD-10-CM

## 2017-06-23 PROBLEM — I95.1 ORTHOSTATIC HYPOTENSION: Status: ACTIVE | Noted: 2017-06-23

## 2017-06-23 PROCEDURE — 99283 EMERGENCY DEPT VISIT LOW MDM: CPT

## 2017-06-23 PROCEDURE — 83935 ASSAY OF URINE OSMOLALITY: CPT | Performed by: INTERNAL MEDICINE

## 2017-06-23 PROCEDURE — 82533 TOTAL CORTISOL: CPT | Performed by: INTERNAL MEDICINE

## 2017-06-23 PROCEDURE — 84133 ASSAY OF URINE POTASSIUM: CPT | Performed by: INTERNAL MEDICINE

## 2017-06-23 PROCEDURE — 83930 ASSAY OF BLOOD OSMOLALITY: CPT | Performed by: INTERNAL MEDICINE

## 2017-06-23 PROCEDURE — 82570 ASSAY OF URINE CREATININE: CPT | Performed by: INTERNAL MEDICINE

## 2017-06-23 PROCEDURE — 99282 EMERGENCY DEPT VISIT SF MDM: CPT

## 2017-06-23 PROCEDURE — 84300 ASSAY OF URINE SODIUM: CPT | Performed by: INTERNAL MEDICINE

## 2017-06-23 NOTE — ED PROVIDER NOTES
Patient Seen in: [facility] Emergency Department    History   Patient presents with: Other    Stated Complaint: \"here for infusion\"    HPI    47-year-old female, sent here for infusion normal saline.     Ever since she had a knee surgery a few mon Location: [address] [place] [address]    PATIENT DOCUMENTED NOT TO HAVE EXPERIENCED ANY OF THE FOLLOWING EVENTS N/A 9/22/2015    Comment Procedure: COLONOSCOPY, POSSIBLE BIOPSY, POSSIBLE POLYPECTOMY 06635;  Surgeon: Reina Trejo MD;  Location: [facility] SURGICAL CE rhythm. Pulmonary/Chest: Effort normal and breath sounds normal. No stridor. Abdominal: Soft. There is no tenderness. There is no guarding. Musculoskeletal: Exhibits no edema or tenderness.    Neurological: Pt is alert and oriented to person, place,

## 2017-06-29 ENCOUNTER — HOSPITAL ENCOUNTER (INPATIENT)
Facility: HOSPITAL | Age: 72
LOS: 3 days | Discharge: HOME | DRG: 643 | End: 2017-07-02
Attending: EMERGENCY MEDICINE | Admitting: HOSPITALIST
Payer: MEDICARE

## 2017-06-29 DIAGNOSIS — E22.2 SIADH (SYNDROME OF INAPPROPRIATE ADH PRODUCTION) (HCC): ICD-10-CM

## 2017-06-29 DIAGNOSIS — E87.1 HYPONATREMIA: ICD-10-CM

## 2017-06-29 DIAGNOSIS — I95.9 HYPOTENSION, UNSPECIFIED HYPOTENSION TYPE: Primary | ICD-10-CM

## 2017-06-29 LAB
ALBUMIN SERPL-MCNC: 3.6 G/DL (ref 3.5–4.8)
ALP LIVER SERPL-CCNC: 79 U/L (ref 55–142)
ALT SERPL-CCNC: 15 U/L (ref 14–54)
AST SERPL-CCNC: 18 U/L (ref 15–41)
ATRIAL RATE: 78 BPM
BASOPHILS # BLD AUTO: 0.02 X10(3) UL (ref 0–0.1)
BASOPHILS NFR BLD AUTO: 0.4 %
BILIRUB SERPL-MCNC: 0.5 MG/DL (ref 0.1–2)
BUN BLD-MCNC: 18 MG/DL (ref 8–20)
BUN BLD-MCNC: 20 MG/DL (ref 8–20)
CALCIUM BLD-MCNC: 8.8 MG/DL (ref 8.3–10.3)
CALCIUM BLD-MCNC: 9.6 MG/DL (ref 8.3–10.3)
CHLORIDE: 89 MMOL/L (ref 101–111)
CHLORIDE: 93 MMOL/L (ref 101–111)
CO2: 20 MMOL/L (ref 22–32)
CO2: 22 MMOL/L (ref 22–32)
CORTISOL: 5.5 UG/DL
CREAT BLD-MCNC: 0.93 MG/DL (ref 0.55–1.02)
CREAT BLD-MCNC: 0.99 MG/DL (ref 0.55–1.02)
DEPRECATED HBV CORE AB SER IA-ACNC: 82.6 NG/ML (ref 10–291)
EOSINOPHIL # BLD AUTO: 0.11 X10(3) UL (ref 0–0.3)
EOSINOPHIL NFR BLD AUTO: 2 %
ERYTHROCYTE [DISTWIDTH] IN BLOOD BY AUTOMATED COUNT: 14 % (ref 11.5–16)
GLUCOSE BLD-MCNC: 100 MG/DL (ref 70–99)
GLUCOSE BLD-MCNC: 83 MG/DL (ref 70–99)
HCT VFR BLD AUTO: 34.9 % (ref 34–50)
HGB BLD-MCNC: 11.8 G/DL (ref 12–16)
IMMATURE GRANULOCYTE COUNT: 0 X10(3) UL (ref 0–1)
IMMATURE GRANULOCYTE RATIO %: 0 %
IRON SATURATION: 16 % (ref 13–45)
IRON: 61 UG/DL (ref 28–170)
LYMPHOCYTES # BLD AUTO: 1.94 X10(3) UL (ref 0.9–4)
LYMPHOCYTES NFR BLD AUTO: 34.7 %
M PROTEIN MFR SERPL ELPH: 7.5 G/DL (ref 6.1–8.3)
MCH RBC QN AUTO: 26.2 PG (ref 27–33.2)
MCHC RBC AUTO-ENTMCNC: 33.8 G/DL (ref 31–37)
MCV RBC AUTO: 77.4 FL (ref 81–100)
MONOCYTES # BLD AUTO: 0.55 X10(3) UL (ref 0.1–0.6)
MONOCYTES NFR BLD AUTO: 9.8 %
NEUTROPHIL ABS PRELIM: 2.97 X10 (3) UL (ref 1.3–6.7)
NEUTROPHILS # BLD AUTO: 2.97 X10(3) UL (ref 1.3–6.7)
NEUTROPHILS NFR BLD AUTO: 53.1 %
OSMOLALITY SERUM: 256 MOSM/KG (ref 280–300)
OSMOLALITY URINE: 260 MOSM/KG (ref 300–1300)
P AXIS: 75 DEGREES
P-R INTERVAL: 158 MS
PLATELET # BLD AUTO: 243 10(3)UL (ref 150–450)
POTASSIUM SERPL-SCNC: 4.3 MMOL/L (ref 3.6–5.1)
POTASSIUM SERPL-SCNC: 4.5 MMOL/L (ref 3.6–5.1)
Q-T INTERVAL: 406 MS
QRS DURATION: 88 MS
QTC CALCULATION (BEZET): 462 MS
R AXIS: 64 DEGREES
RBC # BLD AUTO: 4.51 X10(6)UL (ref 3.8–5.1)
RED CELL DISTRIBUTION WIDTH-SD: 39 FL (ref 35.1–46.3)
SODIUM SERPL-SCNC: 121 MMOL/L (ref 136–144)
SODIUM SERPL-SCNC: 123 MMOL/L (ref 136–144)
SODIUM SERPL-SCNC: 66 MMOL/L
T AXIS: 61 DEGREES
TOTAL IRON BINDING CAPACITY: 370 UG/DL (ref 298–536)
TRANSFERRIN: 248 MG/DL (ref 200–360)
VENTRICULAR RATE: 78 BPM
WBC # BLD AUTO: 5.6 X10(3) UL (ref 4–13)

## 2017-06-29 PROCEDURE — 83935 ASSAY OF URINE OSMOLALITY: CPT | Performed by: INTERNAL MEDICINE

## 2017-06-29 PROCEDURE — 93005 ELECTROCARDIOGRAM TRACING: CPT

## 2017-06-29 PROCEDURE — 83550 IRON BINDING TEST: CPT | Performed by: INTERNAL MEDICINE

## 2017-06-29 PROCEDURE — 93010 ELECTROCARDIOGRAM REPORT: CPT

## 2017-06-29 PROCEDURE — 83540 ASSAY OF IRON: CPT | Performed by: INTERNAL MEDICINE

## 2017-06-29 PROCEDURE — 84300 ASSAY OF URINE SODIUM: CPT | Performed by: INTERNAL MEDICINE

## 2017-06-29 PROCEDURE — 83930 ASSAY OF BLOOD OSMOLALITY: CPT | Performed by: INTERNAL MEDICINE

## 2017-06-29 PROCEDURE — 80053 COMPREHEN METABOLIC PANEL: CPT | Performed by: EMERGENCY MEDICINE

## 2017-06-29 PROCEDURE — 96360 HYDRATION IV INFUSION INIT: CPT

## 2017-06-29 PROCEDURE — 85025 COMPLETE CBC W/AUTO DIFF WBC: CPT | Performed by: EMERGENCY MEDICINE

## 2017-06-29 PROCEDURE — 82533 TOTAL CORTISOL: CPT | Performed by: EMERGENCY MEDICINE

## 2017-06-29 PROCEDURE — 96361 HYDRATE IV INFUSION ADD-ON: CPT

## 2017-06-29 PROCEDURE — 99285 EMERGENCY DEPT VISIT HI MDM: CPT

## 2017-06-29 PROCEDURE — 82728 ASSAY OF FERRITIN: CPT | Performed by: INTERNAL MEDICINE

## 2017-06-29 RX ORDER — ACETAMINOPHEN 325 MG/1
650 TABLET ORAL EVERY 6 HOURS PRN
Status: DISCONTINUED | OUTPATIENT
Start: 2017-06-29 | End: 2017-07-02

## 2017-06-29 RX ORDER — HEPARIN SODIUM 5000 [USP'U]/ML
5000 INJECTION, SOLUTION INTRAVENOUS; SUBCUTANEOUS EVERY 8 HOURS SCHEDULED
Status: DISCONTINUED | OUTPATIENT
Start: 2017-06-29 | End: 2017-06-30

## 2017-06-29 RX ORDER — SODIUM CHLORIDE 9 MG/ML
INJECTION, SOLUTION INTRAVENOUS ONCE
Status: COMPLETED | OUTPATIENT
Start: 2017-06-29 | End: 2017-06-29

## 2017-06-29 RX ORDER — ONDANSETRON 2 MG/ML
4 INJECTION INTRAMUSCULAR; INTRAVENOUS EVERY 6 HOURS PRN
Status: DISCONTINUED | OUTPATIENT
Start: 2017-06-29 | End: 2017-07-02

## 2017-06-29 RX ORDER — SODIUM CHLORIDE 9 MG/ML
INJECTION, SOLUTION INTRAVENOUS CONTINUOUS
Status: ACTIVE | OUTPATIENT
Start: 2017-06-29 | End: 2017-06-29

## 2017-06-29 NOTE — ED INITIAL ASSESSMENT (HPI)
Pt states feeling lightheaded and dizziness, sent from MD office for low BP, orthostatics done in office. 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 Pt seeing nephologist and endocrinologist. Pt denies fever or cold symptoms.

## 2017-06-29 NOTE — ED PROVIDER NOTES
Patient Seen in: [facility] Emergency Department    History   Patient presents with:  Hypotension (cardiovascular)    Stated Complaint: orthostatic hypotensive at nephrology office    HPI    49-year-old female with a history of a total knee replacem [address] [facility]  5/12/10: CT HEART W/ CALCIUM SCORING      Comment: [facility]  SCORE 2  [id number]: HERNIA SURGERY      Comment: 2X  No date: KNEE REPLACEMENT SURGERY      Comment: ON 2/27/2017: right knee replacement  9/22/2015: PATIENT DOCUMENTED NO 25.84 kg/m²         Physical Exam    General:  Patient is alert and oriented x3. No acute distress. Well-developed and well-nourished. HEENT: Normocephalic, atraumatic. Pupils are equally round and reactive to light. Extraocular movements are intact. EKG    Rate, intervals and axes as noted on EKG Report. Rate: 78  Rhythm: Sinus Rhythm  Reading: Normal sinus rhythm. Ventricular rate 78. No acute ST elevation or depression. Rate, axis, intervals are noted.   I agree with computer interpretation stable throughout the emergency department observation period. Review of her labs show market drop in her sodium level from 136-121. Patient apparently has been drinking a copious amount of free water the last several days.   Nephrology recommends admissi

## 2017-06-29 NOTE — PLAN OF CARE
NURSING ADMISSION NOTE      Patient admitted via Cart  Oriented to room. Safety precautions initiated. Bed in low position. Call light in reach. Admission navigator complete.

## 2017-06-30 LAB
ALBUMIN SERPL-MCNC: 3.3 G/DL (ref 3.5–4.8)
BUN BLD-MCNC: 11 MG/DL (ref 8–20)
BUN BLD-MCNC: 14 MG/DL (ref 8–20)
BUN BLD-MCNC: 15 MG/DL (ref 8–20)
CALCIUM BLD-MCNC: 8.5 MG/DL (ref 8.3–10.3)
CALCIUM BLD-MCNC: 8.6 MG/DL (ref 8.3–10.3)
CALCIUM BLD-MCNC: 9.2 MG/DL (ref 8.3–10.3)
CHLORIDE: 90 MMOL/L (ref 101–111)
CHLORIDE: 90 MMOL/L (ref 101–111)
CHLORIDE: 94 MMOL/L (ref 101–111)
CO2: 19 MMOL/L (ref 22–32)
CO2: 20 MMOL/L (ref 22–32)
CO2: 20 MMOL/L (ref 22–32)
CORTISOL: 5.2 UG/DL
CORTISOL: 5.4 UG/DL
CORTISOL: 5.6 UG/DL
CREAT BLD-MCNC: 0.75 MG/DL (ref 0.55–1.02)
CREAT BLD-MCNC: 0.84 MG/DL (ref 0.55–1.02)
CREAT BLD-MCNC: 0.86 MG/DL (ref 0.55–1.02)
CREAT UR-SCNC: 48.3 MG/DL
GLUCOSE BLD-MCNC: 104 MG/DL (ref 70–99)
GLUCOSE BLD-MCNC: 78 MG/DL (ref 70–99)
GLUCOSE BLD-MCNC: 87 MG/DL (ref 70–99)
OSMOLALITY URINE: 361 MOSM/KG (ref 300–1300)
PHOSPHATE SERPL-MCNC: 3.5 MG/DL (ref 2.5–4.9)
POTASSIUM SERPL-SCNC: 4.1 MMOL/L (ref 3.6–5.1)
POTASSIUM SERPL-SCNC: 4.3 MMOL/L (ref 3.6–5.1)
POTASSIUM SERPL-SCNC: 4.3 MMOL/L (ref 3.6–5.1)
SODIUM SERPL-SCNC: 120 MMOL/L (ref 136–144)
SODIUM SERPL-SCNC: 121 MMOL/L (ref 136–144)
SODIUM SERPL-SCNC: 124 MMOL/L (ref 136–144)
SODIUM SERPL-SCNC: 77 MMOL/L

## 2017-06-30 PROCEDURE — 80069 RENAL FUNCTION PANEL: CPT | Performed by: INTERNAL MEDICINE

## 2017-06-30 PROCEDURE — 82570 ASSAY OF URINE CREATININE: CPT | Performed by: INTERNAL MEDICINE

## 2017-06-30 PROCEDURE — 82533 TOTAL CORTISOL: CPT | Performed by: INTERNAL MEDICINE

## 2017-06-30 PROCEDURE — 86256 FLUORESCENT ANTIBODY TITER: CPT | Performed by: INTERNAL MEDICINE

## 2017-06-30 PROCEDURE — 82533 TOTAL CORTISOL: CPT | Performed by: HOSPITALIST

## 2017-06-30 PROCEDURE — 84300 ASSAY OF URINE SODIUM: CPT | Performed by: INTERNAL MEDICINE

## 2017-06-30 PROCEDURE — 83935 ASSAY OF URINE OSMOLALITY: CPT | Performed by: INTERNAL MEDICINE

## 2017-06-30 RX ORDER — HYDROCORTISONE 10 MG/1
10 TABLET ORAL 2 TIMES DAILY
Status: DISCONTINUED | OUTPATIENT
Start: 2017-06-30 | End: 2017-07-02

## 2017-06-30 RX ORDER — SODIUM CHLORIDE 1000 MG
3 TABLET, SOLUBLE MISCELLANEOUS EVERY 4 HOURS
Status: DISCONTINUED | OUTPATIENT
Start: 2017-06-30 | End: 2017-07-01

## 2017-06-30 RX ORDER — SODIUM CHLORIDE 9 MG/ML
INJECTION, SOLUTION INTRAVENOUS CONTINUOUS
Status: DISCONTINUED | OUTPATIENT
Start: 2017-06-30 | End: 2017-06-30

## 2017-06-30 RX ORDER — COSYNTROPIN 0.25 MG/ML
0.25 INJECTION, POWDER, FOR SOLUTION INTRAMUSCULAR; INTRAVENOUS ONCE
Status: COMPLETED | OUTPATIENT
Start: 2017-06-30 | End: 2017-06-30

## 2017-06-30 NOTE — H&P
.  CC: Patient presents with:  Hypotension (cardiovascular)       PCP: Ulisses Villarreal MD    History of Present Illness: Patient is a 74year old female with PMH sig for hyponatremia/SIADH following knee surgery earlier this year.  Since that time, POLYPECTOMY 12830;  Surgeon: Dean Nelson MD;  Location: [address] [place] [address]  No date: REMOVAL GALLBLADDER     ALL:  No Known Allergies     Home Medications:    No outpatient prescriptions have been marked as taking for the 6/29/17 encou [facility] CT abdomen/pelvis from 3/9/2017. CT abdomen/pelvis from 5/21/2010 TECHNIQUE: Routine post-contrast abdominopelvic CT was performed using 80 mL ISOVUE 370. Multiplanar reformatted images were submitted for interpretation.  Automated exposure control nodule, 6.6 mm, significantly larger compared to the 2010 comparison CT; this is nonspecific, possibly scarring/granuloma but given the interval change, follow-up CT chest may be considered. 3. Interval resolution of very small right pleural effusion.  Plea

## 2017-06-30 NOTE — PAYOR COMM NOTE
--------------  ADMISSION REVIEW     Payor: Valeriovenu Duranrand #:  [id number]  Authorization Number: N/A    Admit date: 6/29/2017  2:11 PM       Admitting Physician: Jim Quiros MD  Attending Physician:  Jim Quiros MD  Primary Care Physician:  Ary Lemos air)[TO.2]      Physical Exam    General:  Patient is alert and oriented x3. No acute distress. Well-developed and well-nourished. HEENT: Normocephalic, atraumatic. Pupils are equally round and reactive to light. Extraocular movements are intact.   Adi Born 119/61  Pulse: 81 Patient Position: Sitting  ------------------------------------------------------------  Time: 06/29 1434  Comment: Orthostatic P/BP   Restart Vitals Timer: Yes BP: 98/64  Pulse: 98 Patient Position: Standing      ------------------------ two midnight's based on the clinical documentation in H+P. Based on patients current state of illness, I anticipate that, after discharge, patient will require TBD.          MEDICATIONS ADMINISTERED IN LAST 1 DAY:  Heparin Sodium (Porcine) 5000 UNIT/ML in

## 2017-06-30 NOTE — DIETARY MALNUTRITION NOTE
NUTRITION INITIAL ASSESSMENT    Pt meets severe malnutrition criteria.     NUTRITION DIAGNOSIS/PROBLEM:    Malnutrition related to inability to consume sufficient energy as evidenced by consuming < 50% of estimated energy needs for > 6 months, and a 29 lb w lb)  02/27/17 : 86.2 kg (190 lb)  02/17/17 : 86.6 kg (191 lb)  10/18/16 : 88.1 kg (194 lb 3.2 oz)  09/22/15 : 91.6 kg (202 lb)  08/20/15 : 93.2 kg (205 lb 6.4 oz)  07/28/15 : 95.3 kg (210 lb)  09/12/14 : 94.3 kg (208 lb)  09/02/14 : 93 kg (205 lb)    Aurora Samano

## 2017-06-30 NOTE — CONSULTS
[facility]  Report of Consultation    Florance Kid Patient Status:  Inpatient    1945 MRN [id number]   [facility] 5NW-A Attending Arianna Resendiz MD   Hosp Day # 1 PCP Moisés Sommer MD     Reason for Consultation:  hypot Comment: ON 2/27/2017: right knee replacement  9/22/2015: PATIENT DOCUMENTED NOT TO HAVE EXPERIENCED ANY* N/A      Comment: Procedure: COLONOSCOPY, POSSIBLE BIOPSY,                POSSIBLE POLYPECTOMY 27788;  Surgeon: Robert Fernández MD;  Benjie Dyer ABDOMEN+PELVIS(CONTRAST ONLY)(CPT=74177)  CLINICAL INDICATION: Hypo-osmolality and hyponatremia. COMPARISON STUDY: [facility] CT abdomen/pelvis from 3/9/2017.  CT abdomen/pelvis from 5/21/2010  TECHNIQUE: Routine post-contrast abdominopelvic CT was pe grossly unchanged.  =====  IMPRESSION:  1. Smaller and less hyperdense right adrenal mass compared to 3/9/2017 outside CT abdomen/pelvis.   2. Left lung base nodule, 6.6 mm, significantly larger compared to the 2010 comparison CT; this is  nonspecific, poss given hyponatremia, hyperkalemia, orthostatic hypotension and questionable hyperpigmentation of her hands/palmar creases  - She did have a R adrenal enlargement noted to be 5 cm in size, which has now decreased to 2 cm and a left adrenal gland thickening v

## 2017-06-30 NOTE — PLAN OF CARE
CARDIOVASCULAR - ADULT    • Maintains optimal cardiac output and hemodynamic stability Progressing        METABOLIC/FLUID AND ELECTROLYTES - ADULT    • Hemodynamic stability and optimal renal function maintained Progressing        Patient/Family Goals    •

## 2017-06-30 NOTE — CM/SW NOTE
06/30/17 1400   CM/SW Screening   Referral [facility] staff; Chart review;Nursing rounds   Patient's Mental Status Alert;Oriented   Patient lives with Spouse   Patient Status Prior to Admission   Independent with ADLs an

## 2017-06-30 NOTE — PROGRESS NOTES
[facility]  Nephrology Progress Note    Corrinne Gentry Attending:  Francisco Javier Locke MD       SUBJECTIVE:     Orthostatics repeated this morning and BP dropped to 70s/40s. Patient denies lightheadedness with standing today.    states patient has n 06/29/2017   EOPERCENT 2.0 06/29/2017   BAPERCENT 0.4 06/29/2017   NE 2.97 06/29/2017   LYMABS 1.94 06/29/2017   MOABSO 0.55 06/29/2017   EOABSO 0.11 06/29/2017   BAABSO 0.02 06/29/2017       Lab Results  Component Value Date   MALBP 0.66 06/22/2017   CREU .0.9% qcrixv270yy/hr x 5 hours (total 500 cc) and then will repeat BMP, adjust fluids accordingly. Maintain K>4, encourage protein intake.     DIANA: improved with IVF    Low cortisol: endocrine consult, cosyntropin stim testing today showed minimal response

## 2017-07-01 LAB
ALBUMIN SERPL-MCNC: 3.2 G/DL (ref 3.5–4.8)
BUN BLD-MCNC: 10 MG/DL (ref 8–20)
BUN BLD-MCNC: 11 MG/DL (ref 8–20)
BUN BLD-MCNC: 13 MG/DL (ref 8–20)
CALCIUM BLD-MCNC: 8.8 MG/DL (ref 8.3–10.3)
CALCIUM BLD-MCNC: 8.9 MG/DL (ref 8.3–10.3)
CALCIUM BLD-MCNC: 9 MG/DL (ref 8.3–10.3)
CHLORIDE: 95 MMOL/L (ref 101–111)
CHLORIDE: 95 MMOL/L (ref 101–111)
CHLORIDE: 98 MMOL/L (ref 101–111)
CO2: 19 MMOL/L (ref 22–32)
CO2: 20 MMOL/L (ref 22–32)
CO2: 21 MMOL/L (ref 22–32)
CREAT BLD-MCNC: 0.76 MG/DL (ref 0.55–1.02)
CREAT BLD-MCNC: 0.78 MG/DL (ref 0.55–1.02)
CREAT BLD-MCNC: 0.9 MG/DL (ref 0.55–1.02)
CREAT UR-SCNC: <13 MG/DL
GLUCOSE BLD-MCNC: 83 MG/DL (ref 70–99)
GLUCOSE BLD-MCNC: 89 MG/DL (ref 70–99)
GLUCOSE BLD-MCNC: 93 MG/DL (ref 70–99)
OSMOLALITY URINE: 128 MOSM/KG (ref 300–1300)
PHOSPHATE SERPL-MCNC: 3.1 MG/DL (ref 2.5–4.9)
POTASSIUM SERPL-SCNC: 3.9 MMOL/L (ref 3.6–5.1)
POTASSIUM SERPL-SCNC: 3.9 MMOL/L (ref 3.6–5.1)
POTASSIUM SERPL-SCNC: 4 MMOL/L (ref 3.6–5.1)
SODIUM SERPL-SCNC: 126 MMOL/L (ref 136–144)
SODIUM SERPL-SCNC: 126 MMOL/L (ref 136–144)
SODIUM SERPL-SCNC: 130 MMOL/L (ref 136–144)
SODIUM SERPL-SCNC: 34 MMOL/L

## 2017-07-01 PROCEDURE — 84300 ASSAY OF URINE SODIUM: CPT | Performed by: INTERNAL MEDICINE

## 2017-07-01 PROCEDURE — 82570 ASSAY OF URINE CREATININE: CPT | Performed by: INTERNAL MEDICINE

## 2017-07-01 PROCEDURE — 83935 ASSAY OF URINE OSMOLALITY: CPT | Performed by: INTERNAL MEDICINE

## 2017-07-01 PROCEDURE — 82024 ASSAY OF ACTH: CPT | Performed by: INTERNAL MEDICINE

## 2017-07-01 PROCEDURE — 80069 RENAL FUNCTION PANEL: CPT | Performed by: INTERNAL MEDICINE

## 2017-07-01 RX ORDER — SODIUM CHLORIDE 1000 MG
4 TABLET, SOLUBLE MISCELLANEOUS EVERY 4 HOURS
Status: DISCONTINUED | OUTPATIENT
Start: 2017-07-01 | End: 2017-07-02

## 2017-07-01 NOTE — PROGRESS NOTES
[facility] Hospitalist note    PCP: Ambika Greer MD    Chief Complaint:  73 yo woman with hyponatremia, low appetite, weakness    SUBJECTIVE:  Pt known to me from a previous admission. Reviewed hospital course c pt/husbnad. Eating little better.  Re the last 72 hours. No results for input(s): TROP in the last 72 hours.       Meds:     • sodium chloride  4 g Oral Q4H   • hydrocortisone  10 mg Oral BID        acetaminophen, ondansetron HCl       Assessment/Plan:     Hyponatremia- concern for recurrent

## 2017-07-01 NOTE — PLAN OF CARE
Patient/Family Goals    • Patient/Family Short Term Goal Progressing        SAFETY ADULT - FALL    • Free from fall injury Progressing        Pt A/O x 2-3, confused/forgetful, no distress noted. Pt has no complaints of pain.  Scheduled medication given as o

## 2017-07-01 NOTE — PROGRESS NOTES
[facility] Hospitalist note    PCP: Daniel Laguna MD    Chief Complaint:  75 yo woman with hyponatremia, low appetite, weakness    SUBJECTIVE:  Pt feels about the same. Reports that she vomited after breakfast, has this from time to time.  No dysphagi the last 72 hours.       Meds:     • hydrocortisone  10 mg Oral BID   • sodium chloride  3 g Oral Q4H        acetaminophen, ondansetron HCl       Assessment/Plan:     Hyponatremia- concern for recurrent SIADH with sudden decrease in Na from 135--> 121 in se

## 2017-07-01 NOTE — PROGRESS NOTES
[facility]  Endocrinology Progress Note    Lucius Mcdermott Patient Status:  Inpatient    1945 MRN [id number]   [facility] 5NW-A Attending Betsy Salgado MD   Hosp Day # 2 PCP Suraj Jara MD     Subjective:  She fe Minimal central intrahepatic biliary dilatation,  unchanged. SPLEEN: Unremarkable. ADRENAL GLANDS: Right adrenal mass is much smaller currently and less hyperdense, measuring 2.0 cm. Mild left adrenal nodular fullness is similar to prior study.   PANCREA delay in collection. Ref. Range 6/30/2017 09:47 6/30/2017 10:16   CORTISOL Latest Units: ug/dL 5.6 5.2       Impression and Plan:    1. Abnormal cortisol  2. Hyponatremia  3. Hyperkalemia  4.  Orthostatic Hypotension  - There was concern for adrenal insuf

## 2017-07-01 NOTE — PROGRESS NOTES
[facility] [place]  Nephrology Progress Note    Ngozi Miller Attending: Gerardo Olsen MD       SUBJECTIVE:     Per  a little confused. Started on hydrocortisone 10 mg BID and salt tablets yesterday.   Na level has improved to 126 meq/L today 2.97 06/29/2017   LYMABS 1.94 06/29/2017   MOABSO 0.55 06/29/2017   EOABSO 0.11 06/29/2017   BAABSO 0.02 06/29/2017       Lab Results  Component Value Date   MALBP 0.66 06/22/2017   CREUR <13.00 07/01/2017       Lab Results  Component Value Date   Jessica Needle pm     DIANA: improved with IVF    Adrenal insufficiency: on hydrocortisone, appreciate endocrine recs      Thank you for allowing me to participate in the care of this patient. Please do not hesitate to call with questions or concerns. Landry Shafer Lyme

## 2017-07-02 VITALS
HEART RATE: 71 BPM | BODY MASS INDEX: 25.06 KG/M2 | TEMPERATURE: 97 F | HEIGHT: 67 IN | WEIGHT: 159.63 LBS | RESPIRATION RATE: 16 BRPM | DIASTOLIC BLOOD PRESSURE: 62 MMHG | OXYGEN SATURATION: 100 % | SYSTOLIC BLOOD PRESSURE: 105 MMHG

## 2017-07-02 LAB
ADRENOCORTICOTROPIC HORMONE: 1150 PG/ML
ALBUMIN SERPL-MCNC: 3.1 G/DL (ref 3.5–4.8)
BUN BLD-MCNC: 12 MG/DL (ref 8–20)
BUN BLD-MCNC: 13 MG/DL (ref 8–20)
CALCIUM BLD-MCNC: 8.5 MG/DL (ref 8.3–10.3)
CALCIUM BLD-MCNC: 8.7 MG/DL (ref 8.3–10.3)
CHLORIDE: 107 MMOL/L (ref 101–111)
CHLORIDE: 108 MMOL/L (ref 101–111)
CO2: 21 MMOL/L (ref 22–32)
CO2: 21 MMOL/L (ref 22–32)
CREAT BLD-MCNC: 0.73 MG/DL (ref 0.55–1.02)
CREAT BLD-MCNC: 0.92 MG/DL (ref 0.55–1.02)
GLUCOSE BLD-MCNC: 101 MG/DL (ref 70–99)
GLUCOSE BLD-MCNC: 79 MG/DL (ref 70–99)
PHOSPHATE SERPL-MCNC: 2.8 MG/DL (ref 2.5–4.9)
POTASSIUM SERPL-SCNC: 3.7 MMOL/L (ref 3.6–5.1)
POTASSIUM SERPL-SCNC: 4.2 MMOL/L (ref 3.6–5.1)
SODIUM SERPL-SCNC: 135 MMOL/L (ref 136–144)
SODIUM SERPL-SCNC: 137 MMOL/L (ref 136–144)

## 2017-07-02 PROCEDURE — 80069 RENAL FUNCTION PANEL: CPT | Performed by: INTERNAL MEDICINE

## 2017-07-02 RX ORDER — POTASSIUM CHLORIDE 20 MEQ/1
40 TABLET, EXTENDED RELEASE ORAL ONCE
Status: COMPLETED | OUTPATIENT
Start: 2017-07-02 | End: 2017-07-02

## 2017-07-02 RX ORDER — SODIUM CHLORIDE 1000 MG
2 TABLET, SOLUBLE MISCELLANEOUS EVERY 4 HOURS
Status: DISCONTINUED | OUTPATIENT
Start: 2017-07-02 | End: 2017-07-02

## 2017-07-02 RX ORDER — HYDROCORTISONE 10 MG/1
10 TABLET ORAL 2 TIMES DAILY
Qty: 60 TABLET | Refills: 0 | Status: SHIPPED | OUTPATIENT
Start: 2017-07-02 | End: 2017-07-11

## 2017-07-02 RX ORDER — SODIUM CHLORIDE 1000 MG
2 TABLET, SOLUBLE MISCELLANEOUS
Qty: 180 TABLET | Refills: 11 | Status: SHIPPED | OUTPATIENT
Start: 2017-07-02 | End: 2017-07-10

## 2017-07-02 NOTE — PROGRESS NOTES
[facility]  Endocrinology Progress Note    Kody Johnston Patient Status:  Inpatient    1945 MRN [id number]   [facility] 5NW-A Attending Laureano Hernandez MD   Hosp Day # 3 PCP Isabella Veronica MD     Subjective:  Hydroc cm.  Mild left adrenal nodular fullness is similar to prior study. PANCREAS: Unchanged. ASCITES/FREE AIR: None. KIDNEYS: Unremarkable. Previously noted perinephric fat stranding has resolved. ABDOMINAL AORTA: Scattered vascular calcifications.   Monserrat Speck Hyperkalemia  4.  Orthostatic Hypotension  - There was concern for adrenal insufficiency given hyponatremia, hyperkalemia, orthostatic hypotension and questionable hyperpigmentation of her hands/palmar creases  - She did have a R adrenal enlargement noted t

## 2017-07-02 NOTE — PLAN OF CARE
CARDIOVASCULAR - ADULT    • Maintains optimal cardiac output and hemodynamic stability Adequate for Discharge        METABOLIC/FLUID AND ELECTROLYTES - ADULT    • Hemodynamic stability and optimal renal function maintained Adequate for Discharge        Pattie Wilkes

## 2017-07-02 NOTE — PROGRESS NOTES
NURSING DISCHARGE NOTE    Discharged Home via Wheelchair. Accompanied by Spouse and Support staff  Belongings Taken by patient/family. Pt and  received discharge instructions and education.  picked up prescriptions.  Follow up appointm

## 2017-07-02 NOTE — DISCHARGE PLANNING
[facility] Internal Medicine Discharge Summary   Patient ID:  Maria Antonia Healy  [id number]  89 year old  2/1/1945    Admit date: 6/29/2017    Discharge date and time: 7/2/2017     Attending Physician: Nereyda Encinas MD     Primary Care Physician: Savi Davies Otherwise denies fevers/chills, cough/sob, recent illnesses, changes in meds. Does feel dizzy when standing.     Hospital Course:   Hyponatremia- concern for recurrent SIADH with sudden decrease in Na from 135--> 121 in setting of increased free water int chloride 1 g Tabs         Consults: IP CONSULT TO NEPHROLOGY  IP CONSULT TO HOSPITALIST  IP CONSULT TO ENDOCRINOLOGY  NURSING CONSULT TO DIETITIAN    Radiology: Ct Abdomen+pelvis(contrast Only)(cpt=74177)    Result Date: 6/28/2017  CT ABDOMEN+PELVIS(CONTRA the presence of a moderate amount of stool. Terminal ileum is grossly unremarkable. OSSEOUS STRUCTURES: Degenerative changes of the spine. Grade 1 anterolisthesis of L3 on L4. OTHER: Pelvic organs are grossly unchanged. IMPRESSION: 1.  Smaller and less h

## 2017-07-02 NOTE — PROGRESS NOTES
[facility] [place]  Nephrology Progress Note    Kassieett Cure Attending: Nereyda Encinas MD       SUBJECTIVE:     Continues on hydrocortisone. Appetite improved. Slight overcorrection in Na level to 137 this morning.     PHYSICAL EXAM:     Vital Signs: 06/29/2017   MOABSO 0.55 06/29/2017   EOABSO 0.11 06/29/2017   BAABSO 0.02 06/29/2017       Lab Results  Component Value Date   MALBP 0.66 06/22/2017   CREUR <13.00 07/01/2017       Lab Results  Component Value Date   COLORUR Yellow 03/09/2017   CLARITY Ha be discharged with salt tablets at 2 g TID     DIANA: improved with IVF     Adrenal insufficiency: on hydrocortisone, appreciate endocrine recs    Ok to discharge today pending noon labs. Will need nephrology follow up in 1-2 weeks for salt tab weaning.     Barby Yin

## 2017-07-03 NOTE — PROGRESS NOTES
The ACTH is high, pt was in the hospital now out patient, will await your recommendations  Thank you  [facility]

## 2017-07-06 LAB — 21-HYDROXYLASE ANTIBODY: 0.9 U/ML

## 2017-07-06 NOTE — PROGRESS NOTES
Olegario Vasquez - this came back negative. I wonder if she had some type of adrenal hemorrhage or inflammation that resolved. CT scans are interesting. Her ACTH was quite high as well with mild hyperpigmentation on exam, marla her palmar creases.  Just wanted to forwa

## 2017-07-10 PROCEDURE — 83498 ASY HYDROXYPROGESTERONE 17-D: CPT | Performed by: INTERNAL MEDICINE

## 2017-07-10 PROCEDURE — 82088 ASSAY OF ALDOSTERONE: CPT | Performed by: INTERNAL MEDICINE

## 2017-07-10 PROCEDURE — 84244 ASSAY OF RENIN: CPT | Performed by: INTERNAL MEDICINE

## 2017-07-11 NOTE — CDS QUERY
Lucie Cespedes  Dear Dr. Tawny Gasca:  Clinical information suggests potential for impaired nutritional status.  For accurate ICD-10-CM code assignment to reflect severity of illness and risk of mortality,  PLEASE “X” ALL DIAGNOSES THAT APPLY

## 2017-07-17 NOTE — DISCHARGE SUMMARY
[facility] Internal Medicine Discharge Summary   Patient ID:  Moustapha Jacobo  [id number]  45 year old  2/1/1945    Admit date: 6/29/2017    Discharge date and time: 7/2/2017     Attending Physician: Marbella Escobedo MD     Primary Care Physician: Nabeel Hess Otherwise denies fevers/chills, cough/sob, recent illnesses, changes in meds. Does feel dizzy when standing.     Hospital Course:   Hyponatremia- concern for recurrent SIADH with sudden decrease in Na from 135--> 121 in setting of increased free water int submitted for interpretation. Automated exposure control and ALARA manual techniques for patient specific dose reduction were followed while maintaining the necessary diagnostic image quality. ADVERSE REACTION: None.  FINDINGS:  LUNG BASES: 6.6 mm subpleura resolution of very small right pleural effusion. Please see above for details and additional information.        Operative Procedures:      Code Status: Full Code    Total Time Coordinating Care: Greater than 30 minutes    Patient had opportunity to ask que

## 2017-07-18 PROBLEM — I95.9 HYPOTENSION, UNSPECIFIED HYPOTENSION TYPE: Status: RESOLVED | Noted: 2017-06-29 | Resolved: 2017-07-18

## 2017-07-18 PROBLEM — D72.829 LEUKOCYTOSIS, UNSPECIFIED TYPE: Chronic | Status: RESOLVED | Noted: 2017-03-09 | Resolved: 2017-07-18

## 2017-07-18 PROBLEM — E27.40 ADRENAL INSUFFICIENCY (HCC): Status: ACTIVE | Noted: 2017-07-18

## 2017-07-18 PROBLEM — I95.1 ORTHOSTATIC HYPOTENSION: Status: RESOLVED | Noted: 2017-06-23 | Resolved: 2017-07-18

## 2017-07-18 PROBLEM — R41.0 DISORIENTATION: Status: RESOLVED | Noted: 2017-03-09 | Resolved: 2017-07-18

## 2017-07-18 PROBLEM — E87.1 HYPONATREMIA: Status: RESOLVED | Noted: 2017-03-09 | Resolved: 2017-07-18

## 2017-07-18 PROBLEM — J18.9 COMMUNITY ACQUIRED PNEUMONIA: Status: RESOLVED | Noted: 2017-03-09 | Resolved: 2017-07-18

## 2017-07-18 PROBLEM — Z91.81 AT RISK FOR FALLING: Status: RESOLVED | Noted: 2017-03-10 | Resolved: 2017-07-18

## 2017-10-10 PROCEDURE — 82024 ASSAY OF ACTH: CPT | Performed by: INTERNAL MEDICINE

## 2017-10-10 PROCEDURE — 84244 ASSAY OF RENIN: CPT | Performed by: INTERNAL MEDICINE

## 2017-10-10 PROCEDURE — 83498 ASY HYDROXYPROGESTERONE 17-D: CPT | Performed by: INTERNAL MEDICINE

## 2017-10-10 PROCEDURE — 82088 ASSAY OF ALDOSTERONE: CPT | Performed by: INTERNAL MEDICINE

## 2018-03-16 PROBLEM — I95.9 HYPOTENSION: Status: RESOLVED | Noted: 2017-06-29 | Resolved: 2018-03-16

## 2018-03-16 PROBLEM — Z86.39 HISTORY OF SIADH: Status: ACTIVE | Noted: 2018-03-16

## 2018-03-16 PROBLEM — E22.2 SIADH (SYNDROME OF INAPPROPRIATE ADH PRODUCTION) (HCC): Status: RESOLVED | Noted: 2017-06-29 | Resolved: 2018-03-16

## 2018-03-16 PROCEDURE — 82024 ASSAY OF ACTH: CPT | Performed by: INTERNAL MEDICINE

## 2018-03-16 PROCEDURE — 82088 ASSAY OF ALDOSTERONE: CPT | Performed by: INTERNAL MEDICINE

## 2018-03-16 PROCEDURE — 84244 ASSAY OF RENIN: CPT | Performed by: INTERNAL MEDICINE

## 2018-03-26 PROCEDURE — 82043 UR ALBUMIN QUANTITATIVE: CPT | Performed by: INTERNAL MEDICINE

## 2018-03-26 PROCEDURE — 82570 ASSAY OF URINE CREATININE: CPT | Performed by: INTERNAL MEDICINE

## 2018-09-15 ENCOUNTER — HOSPITAL ENCOUNTER (EMERGENCY)
Facility: HOSPITAL | Age: 73
Discharge: HOME | End: 2018-09-15
Attending: EMERGENCY MEDICINE
Payer: MEDICARE

## 2018-09-15 VITALS
WEIGHT: 170 LBS | HEART RATE: 70 BPM | BODY MASS INDEX: 25.76 KG/M2 | OXYGEN SATURATION: 99 % | HEIGHT: 68 IN | SYSTOLIC BLOOD PRESSURE: 131 MMHG | TEMPERATURE: 97 F | DIASTOLIC BLOOD PRESSURE: 65 MMHG | RESPIRATION RATE: 18 BRPM

## 2018-09-15 DIAGNOSIS — S01.81XA LACERATION OF BROW WITHOUT COMPLICATION, INITIAL ENCOUNTER: Primary | ICD-10-CM
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
## 2018-09-15 PROCEDURE — 99283 EMERGENCY DEPT VISIT LOW MDM: CPT

## 2018-09-15 PROCEDURE — 12053 INTMD RPR FACE/MM 5.1-7.5 CM: CPT

## 2018-09-16 NOTE — ED PROVIDER NOTES
Patient Seen in: [facility] Emergency Department    History   Patient presents with:  Fall (musculoskeletal, neurologic)  Laceration Abrasion (integumentary)    Stated Complaint: fall lac to rt eyebrow    HPI    Patient presents to the emergency dep CommentHans Lias  SCORE 2  [id number]: HERNIA SURGERY      Comment:  2X  No date: KNEE REPLACEMENT SURGERY      Comment:  ON 2/27/2017: right knee replacement  2/27/2017: KNEE TOTAL REPLACEMENT; Right      Comment:  Performed by Krystyna Ybarra MD at [place] [address]. are intact.   Neck is supple and nontender to palpation bilateral hands have slight abrasions of the palmar surface but no significant traumatic injuries patient is full range of motion of her upper and lower extremities    ED Course   Labs Reviewed - No da

## 2018-09-16 NOTE — ED INITIAL ASSESSMENT (HPI)
Patient sts tripped and fell landed on sidewalk. Denies LOC. Pt sts when she was cleaning the wound she got a little dizzy. Patient is up to date with tetanus.  sts she took 4 amoxicillin pills unknown dose.

## 2018-09-21 ENCOUNTER — HOSPITAL ENCOUNTER (EMERGENCY)
Facility: HOSPITAL | Age: 73
Discharge: HOME | End: 2018-09-21
Payer: MEDICARE

## 2018-09-21 VITALS
RESPIRATION RATE: 16 BRPM | WEIGHT: 170 LBS | HEART RATE: 64 BPM | OXYGEN SATURATION: 99 % | HEIGHT: 67 IN | BODY MASS INDEX: 26.68 KG/M2 | SYSTOLIC BLOOD PRESSURE: 131 MMHG | DIASTOLIC BLOOD PRESSURE: 75 MMHG | TEMPERATURE: 98 F

## 2018-09-21 DIAGNOSIS — Z48.02 ENCOUNTER FOR REMOVAL OF SUTURES: ICD-10-CM

## 2018-09-21 DIAGNOSIS — S01.111D EYEBROW LACERATION, RIGHT, SUBSEQUENT ENCOUNTER: Primary | ICD-10-CM

## 2018-09-21 NOTE — ED INITIAL ASSESSMENT (HPI)
Patient presents for suture removal. She had the sutures placed on Saturday 9/15. They are covered with a steri strip at this time. Denies redness or drainage.

## 2018-09-21 NOTE — ED PROVIDER NOTES
Patient Seen in: [facility] Emergency Department    History   Patient presents with:  Donnell Kinsey (ingtegumentary)    Stated Complaint: suture removal    HPI    CHIEF COMPLAINT: Suture removal     HISTORY OF PRESENT ILLNESS: Patient is a 74 BIOPSY, POSSIBLE                POLYPECTOMY 09350;  Surgeon: Idania Richardson MD;  Location:                [address] [place] [address]  5/12/10: CT HEART W/ CALCIUM SCORING      Comment:  [facility]  SCORE 2  [id number]: HERNIA SURGERY      Comment:  2X  No date: Kerry Warren m/c/r  Musculoskeletal: Extremities are symmetrical, full range of motion  Skin:  warm and dry, no rashes. Right eyebrow: There is a well-healed laceration with 7 sutures in place. Sutures are removed without difficulty. Patient tolerated well.   There

## 2019-03-08 PROBLEM — E27.1 PRIMARY ADRENAL INSUFFICIENCY (HCC): Status: ACTIVE | Noted: 2017-07-18

## 2019-03-08 PROCEDURE — 82088 ASSAY OF ALDOSTERONE: CPT | Performed by: INTERNAL MEDICINE

## 2019-03-08 PROCEDURE — 84244 ASSAY OF RENIN: CPT | Performed by: INTERNAL MEDICINE

## 2019-03-08 PROCEDURE — 82024 ASSAY OF ACTH: CPT | Performed by: INTERNAL MEDICINE

## 2019-03-08 PROCEDURE — 83498 ASY HYDROXYPROGESTERONE 17-D: CPT | Performed by: INTERNAL MEDICINE

## 2020-01-14 NOTE — DISCHARGE SUMMARY
General Medicine Discharge Summary     Patient ID:  Trish Patel  74year old  2/1/1945    Admit date: 3/9/2017    Discharge date and time: 3/20/2017.      Attending Physician: Radha Ye     Primary Care Physician: Ginger Poole, Hyponatremia  Active Problems:    Disorientation    Leukocytosis, unspecified type    Community acquired pneumonia    At risk for falling      Encephalopathy : acute metabolic  -Etiology likely 2/2 hyponatremia  -CT head negative for acute pathology on adm PHARMACY  IP CONSULT TO PHYSICAL THERAPY  IP CONSULT TO SOCIAL WORK  IP CONSULT TO HEMATOLOGY  IP CONSULT TO NEUROLOGY    Operative Procedures:       Disposition: home    Patient Instructions:   Current Discharge Medication List    START taking these medic Principal Discharge DX:	UTI (urinary tract infection)

## 2020-01-21 PROBLEM — D69.6 THROMBOCYTOPENIA, UNSPECIFIED (HCC): Status: ACTIVE | Noted: 2020-01-21

## 2020-01-21 PROBLEM — D69.6 THROMBOCYTOPENIA, UNSPECIFIED: Status: ACTIVE | Noted: 2020-01-21

## 2020-09-21 PROCEDURE — 88305 TISSUE EXAM BY PATHOLOGIST: CPT | Performed by: INTERNAL MEDICINE

## 2020-10-07 NOTE — PROGRESS NOTES
Called and spoke with pt for symptom update since visit with GEORGINA Lemus on 10/2/20 and medication changes were made.     Pt states the heaviness she felt around her heart is gone now. She reports she still feel fluttering in her heart from time to time, but it is not as bad as it had been and she has not noticed her heart racing at all, so no shortness of breath either.     Confirmed with pt that she is scheduled for a BMP lab and 30-day event monitor placement on 10/9/20.     Routed to GEORGINA Lemus for review.     SHELIA Zuniga 9:34 AM 10/7/2020     Patient has no right knee pain.  is present. Have been walking and doing PT. Right knee wound is healed. No signs of infection. Dry. Minimal swelling. Calf is NT.  MS intact. ROM: 0-100.     S/p right TKR:    Doing well from knee surgery st

## 2021-01-18 PROBLEM — N18.31 STAGE 3A CHRONIC KIDNEY DISEASE (HCC): Status: ACTIVE | Noted: 2021-01-18

## 2021-03-11 NOTE — ED AVS SNAPSHOT
[facility] Emergency Department    [address] [place] [address]    Phone:  [phone]    Fax:  [phone]   MRN: [id number]    Department:  [facility] Emergency Department   Date of Visit:  6/23 done   Main [phone]  Pediatric [phone] Emergency Department   [phone]       To Check ER Wait Times:  TEXT 'ERwait' to 63602      Click [email or web address]      Or call [phone]    If you have any problems with your follow-up, ple before you leave. After you leave, you should follow the attached instructions. I have read and understand the instructions given to me by my caregivers. 24-Hour Pharmacies        Pharmacy Address Phone Number   [phone] [place] [address] Drive. view more details from this visit by going to [email or web address]. If you've recently had a stay at the Hospital you can access your discharge instructions in [employer] by going to Visits < Admission Summaries.  If you've been to the Emergency Depar

## 2021-04-19 PROBLEM — N18.31 STAGE 3A CHRONIC KIDNEY DISEASE (HCC): Status: RESOLVED | Noted: 2021-01-18 | Resolved: 2021-04-19

## 2023-08-10 ENCOUNTER — HOSPITAL ENCOUNTER (OUTPATIENT)
Dept: GENERAL RADIOLOGY | Facility: HOSPITAL | Age: 78
Discharge: HOME | End: 2023-08-10
Attending: RADIOLOGY
Payer: MEDICARE

## 2023-08-10 ENCOUNTER — HOSPITAL ENCOUNTER (OUTPATIENT)
Dept: CT IMAGING | Facility: HOSPITAL | Age: 78
Discharge: HOME | End: 2023-08-10
Attending: INTERNAL MEDICINE
Payer: MEDICARE

## 2023-08-10 ENCOUNTER — NURSE ONLY (OUTPATIENT)
Dept: LAB | Facility: HOSPITAL | Age: 78
End: 2023-08-10
Attending: INTERNAL MEDICINE
Payer: MEDICARE

## 2023-08-10 VITALS
TEMPERATURE: 98 F | DIASTOLIC BLOOD PRESSURE: 66 MMHG | RESPIRATION RATE: 16 BRPM | HEART RATE: 59 BPM | OXYGEN SATURATION: 99 % | BODY MASS INDEX: 28.72 KG/M2 | HEIGHT: 67 IN | WEIGHT: 183 LBS | SYSTOLIC BLOOD PRESSURE: 119 MMHG

## 2023-08-10 DIAGNOSIS — R91.8 LUNG MASS: ICD-10-CM

## 2023-08-10 DIAGNOSIS — R91.8 LUNG MASS: Primary | ICD-10-CM

## 2023-08-10 LAB
ERYTHROCYTE [DISTWIDTH] IN BLOOD BY AUTOMATED COUNT: 13.2 %
HCT VFR BLD AUTO: 45.9 %
HGB BLD-MCNC: 14.7 G/DL
INR BLD: 1.06 (ref 0.85–1.16)
MCH RBC QN AUTO: 29.4 PG (ref 26–34)
MCHC RBC AUTO-ENTMCNC: 32 G/DL (ref 31–37)
MCV RBC AUTO: 91.8 FL
PLATELET # BLD AUTO: 229 10(3)UL (ref 150–450)
PROTHROMBIN TIME: 13.8 SECONDS (ref 11.6–14.8)
RBC # BLD AUTO: 5 X10(6)UL
WBC # BLD AUTO: 6 X10(3) UL (ref 4–11)

## 2023-08-10 PROCEDURE — 88341 IMHCHEM/IMCYTCHM EA ADD ANTB: CPT | Performed by: INTERNAL MEDICINE

## 2023-08-10 PROCEDURE — 88312 SPECIAL STAINS GROUP 1: CPT | Performed by: INTERNAL MEDICINE

## 2023-08-10 PROCEDURE — 99153 MOD SED SAME PHYS/QHP EA: CPT | Performed by: INTERNAL MEDICINE

## 2023-08-10 PROCEDURE — 85027 COMPLETE CBC AUTOMATED: CPT | Performed by: RADIOLOGY

## 2023-08-10 PROCEDURE — 32408 CORE NDL BX LNG/MED PERQ: CPT | Performed by: INTERNAL MEDICINE

## 2023-08-10 PROCEDURE — 71045 X-RAY EXAM CHEST 1 VIEW: CPT | Performed by: RADIOLOGY

## 2023-08-10 PROCEDURE — 85610 PROTHROMBIN TIME: CPT

## 2023-08-10 PROCEDURE — 88342 IMHCHEM/IMCYTCHM 1ST ANTB: CPT | Performed by: INTERNAL MEDICINE

## 2023-08-10 PROCEDURE — 36415 COLL VENOUS BLD VENIPUNCTURE: CPT

## 2023-08-10 PROCEDURE — 88305 TISSUE EXAM BY PATHOLOGIST: CPT | Performed by: INTERNAL MEDICINE

## 2023-08-10 PROCEDURE — 99152 MOD SED SAME PHYS/QHP 5/>YRS: CPT | Performed by: INTERNAL MEDICINE

## 2023-08-10 RX ORDER — SODIUM CHLORIDE 9 MG/ML
INJECTION, SOLUTION INTRAVENOUS CONTINUOUS
Status: DISCONTINUED | OUTPATIENT
Start: 2023-08-10 | End: 2023-08-12

## 2023-08-10 RX ORDER — HYDROCODONE BITARTRATE AND ACETAMINOPHEN 5; 325 MG/1; MG/1
1 TABLET ORAL EVERY 4 HOURS PRN
Status: DISCONTINUED | OUTPATIENT
Start: 2023-08-10 | End: 2023-08-12

## 2023-08-10 RX ORDER — MIDAZOLAM HYDROCHLORIDE 1 MG/ML
INJECTION INTRAMUSCULAR; INTRAVENOUS
Status: COMPLETED
Start: 2023-08-10 | End: 2023-08-10

## 2023-08-10 RX ORDER — ACETAMINOPHEN 325 MG/1
650 TABLET ORAL EVERY 4 HOURS PRN
Status: DISCONTINUED | OUTPATIENT
Start: 2023-08-10 | End: 2023-08-12

## 2023-08-10 RX ORDER — NALOXONE HYDROCHLORIDE 0.4 MG/ML
80 INJECTION, SOLUTION INTRAMUSCULAR; INTRAVENOUS; SUBCUTANEOUS AS NEEDED
Status: DISCONTINUED | OUTPATIENT
Start: 2023-08-10 | End: 2023-08-12

## 2023-08-10 RX ORDER — MIDAZOLAM HYDROCHLORIDE 1 MG/ML
1 INJECTION INTRAMUSCULAR; INTRAVENOUS EVERY 5 MIN PRN
Status: DISCONTINUED | OUTPATIENT
Start: 2023-08-10 | End: 2023-08-10

## 2023-08-10 RX ORDER — FLUMAZENIL 0.1 MG/ML
0.2 INJECTION INTRAVENOUS AS NEEDED
Status: DISCONTINUED | OUTPATIENT
Start: 2023-08-10 | End: 2023-08-12

## 2023-08-10 RX ORDER — HYDROCODONE BITARTRATE AND ACETAMINOPHEN 5; 325 MG/1; MG/1
2 TABLET ORAL EVERY 4 HOURS PRN
Status: DISCONTINUED | OUTPATIENT
Start: 2023-08-10 | End: 2023-08-12

## 2023-08-10 RX ADMIN — MIDAZOLAM HYDROCHLORIDE 1 MG: 1 INJECTION INTRAMUSCULAR; INTRAVENOUS at 11:54:00

## 2023-08-10 RX ADMIN — SODIUM CHLORIDE: 9 INJECTION, SOLUTION INTRAVENOUS at 11:44:00

## 2023-08-10 NOTE — PROCEDURES
[facility]  Procedure Note    Sushil Brar Patient Status:  Outpatient    1945 MRN [id number]   [facility] CT Attending Bethany Cabrera MD   Hosp Day # 0 PCP Ana Rivers MD     Procedure: left lung Bx    Pre-Procedure Diagnosis:  nodule, PET +    Post-Procedure Diagnosis: same    Anesthesia:  Sedation    Findings:  left base lung nodule    Specimens: 4 20 g 10 mm core    Blood Loss:  none    Tourniquet Time: none  Complications:   PTX small after 1st core.   Drains:  none    Secondary Diagnosis:  none    Ros Corona MD  8/10/2023

## 2023-08-10 NOTE — DISCHARGE INSTRUCTIONS
[facility] Department of Radiology    Biopsy of any type    Dr. Melissa Rogers  (interventional radiologist)    Have someone drive you home after your procedure. You may not drive yourself home    [address]    Take things easy for the rest of the day after your biopsy.   You may be sore in the biopsy area for one to five days  DO drink plenty of fluids  DO resume your regular diet  DO keep a bandage on the biopsy site for at least 24 hours  DO NOT take a hot bath or shower for at least 12 hours  DO NOT drive or operative machinery for at least 24 hours  DO NOT smoke for at least 24 hours  DO NOT do any strenuous exercise or lifting for at least 48 hours  DO call your doctor immediately if  You start bleeding  There is any change in color or temperature of the area where the biopsy was performed  You develop increasing pain in shortness of breath

## 2023-08-10 NOTE — IMAGING NOTE
Received patient to CT room 1. Patient states NPO since 1800 last night. Patient states that she does not take blood thinners or NSAIDs at all. Lab results of PT/INR and PLT reviewed. Informed consent obtained by Dr. Kashif De La Fuente. Patient positioned prone on scanner. CRM and pulse ox monitoring applied and alarms enabled. Tegaderm and Neosporin dressing applied to site. Dressing is clean dry and intact. Patient positioned supine on gurney. Patient denies pain and in no apparent distress. Per Dr. Kashif De La Fuente, 1.5cm pneuothorax noted. SBAR called to Nora Scruggs RN. Patient transported to room 8804 [id number] with belongings. Patient accompanied by Duc Flores RN to [facility] with her belongings and her  Gabriela Emerson.

## 2023-09-06 ENCOUNTER — OFFICE VISIT (OUTPATIENT)
Dept: HEMATOLOGY/ONCOLOGY | Facility: HOSPITAL | Age: 78
End: 2023-09-06
Attending: THORACIC SURGERY (CARDIOTHORACIC VASCULAR SURGERY)
Payer: MEDICARE

## 2023-09-06 VITALS
OXYGEN SATURATION: 99 % | TEMPERATURE: 97 F | DIASTOLIC BLOOD PRESSURE: 81 MMHG | SYSTOLIC BLOOD PRESSURE: 130 MMHG | BODY MASS INDEX: 27 KG/M2 | WEIGHT: 175 LBS | RESPIRATION RATE: 18 BRPM | HEART RATE: 71 BPM

## 2023-09-06 DIAGNOSIS — C34.32 PRIMARY ADENOCARCINOMA OF LOWER LOBE OF LEFT LUNG (HCC): Primary | ICD-10-CM

## 2023-09-06 PROCEDURE — 99211 OFF/OP EST MAY X REQ PHY/QHP: CPT

## 2023-09-12 RX ORDER — CHOLECALCIFEROL (VITAMIN D3) 125 MCG
500 CAPSULE ORAL DAILY
COMMUNITY

## 2023-09-12 RX ORDER — PHENOL 1.4 %
600 AEROSOL, SPRAY (ML) MUCOUS MEMBRANE DAILY
COMMUNITY

## 2023-09-12 RX ORDER — DONEPEZIL HYDROCHLORIDE 10 MG/1
10 TABLET, FILM COATED ORAL NIGHTLY
COMMUNITY

## 2023-09-12 NOTE — PAT NURSING NOTE
PAT nursing note: patient denies taking beta blockers, ACEs, ARBs, Aspirin or blood thinners; no PPM/ICD; patient/spouse verbalized understanding of all instructions. Pre-Surgical Instructions for 9/15/23      Pre-Surgical Testing:     -You are scheduled for a COVID-19 test and non-fasting blood work Wednesday, 9/13 starting at 10:00 am at the [facility]. -If the COVID-19 test is refused or is positive, the procedure will be cancelled. Visitor Instructions    -Visitors may accompany you the day of your surgery and can visit from 7:00 am-8:00 pm during you inpatient hospital stay. Pre-Op Instructions    Scheduled Surgery: You are scheduled for Thoracic Surgery with Dr. Terra Wagner      Date of Procedure: Friday, 09/15/23      Time of Arrival:  11:30 am     -This is going to be a tentative time of arrival for surgery.   -We will call you the buisness day prior to your surgery in the late afternoon to reconfirm your arrival.  -Please check your voicemail for a message. Diet Instructions:    -Do not eat or drink after midnight. This includes extra water/other fluids, gum, candy and food. Medication Instructions:     -Do not take any medications the morning of your surgery. Medications to Stop:     -the last dose of vitamins, supplements, and NSAID's (ex. Ibuprofen, Excederin, Aleve, Diclofenac, and any gels having steroids) will be today. Skin Prep:     -Shower with Dial Soap the morning of your surgery (or any antibacterial soap). -Wear deodorant; no make-up, lotion, powder, perfume. Admit/Discharge Status:     -You will be admitted to the hospital after surgery. Discharge Teaching:     -Your nurse will give you specific instructions before discharge.  -Any questions, please call the surgeon's office.       Additional Instructions:     -Bring insurance card(s) and picture ID.  -Leave all valuables at home, including jewelry.  -Wear comfortable clothing.  -No contacts, wear glasses.  -You'll receive arrival time 1 business day prior to scheduled surgery    -Park in the [place] parking garage at [address] in at the [facility] reception desk in the [place]. -Our  will be there to check you in for your procedure.   -Complimentary  parking is available starting at 6:00 am      If you are scheduled to arrive early for 5:30 am, the [facility] reception desk does not open till 5:30 am. It may be dark, but you are in the correct location. We are open M-F from 8am- 5pm. We are closed on holidays and weekends. We can be reached at [phone].          Thank you

## 2023-09-13 ENCOUNTER — LAB ENCOUNTER (OUTPATIENT)
Dept: LAB | Facility: HOSPITAL | Age: 78
End: 2023-09-13
Attending: STUDENT IN AN ORGANIZED HEALTH CARE EDUCATION/TRAINING PROGRAM
Payer: MEDICARE

## 2023-09-13 DIAGNOSIS — Z01.818 PRE-OP TESTING: ICD-10-CM

## 2023-09-13 DIAGNOSIS — C34.32 PRIMARY ADENOCARCINOMA OF LOWER LOBE OF LEFT LUNG (HCC): ICD-10-CM

## 2023-09-13 LAB
ALBUMIN SERPL-MCNC: 3.5 G/DL (ref 3.4–5)
ALBUMIN/GLOB SERPL: 0.9 {RATIO} (ref 1–2)
ALP LIVER SERPL-CCNC: 62 U/L
ALT SERPL-CCNC: 23 U/L
ANION GAP SERPL CALC-SCNC: 5 MMOL/L (ref 0–18)
ANTIBODY SCREEN: NEGATIVE
AST SERPL-CCNC: 16 U/L (ref 15–37)
BASOPHILS # BLD AUTO: 0.02 X10(3) UL (ref 0–0.2)
BASOPHILS NFR BLD AUTO: 0.3 %
BILIRUB SERPL-MCNC: 0.6 MG/DL (ref 0.1–2)
BUN BLD-MCNC: 13 MG/DL (ref 7–18)
CALCIUM BLD-MCNC: 9.1 MG/DL (ref 8.5–10.1)
CHLORIDE SERPL-SCNC: 109 MMOL/L (ref 98–112)
CO2 SERPL-SCNC: 26 MMOL/L (ref 21–32)
CREAT BLD-MCNC: 1.04 MG/DL
EGFRCR SERPLBLD CKD-EPI 2021: 54 ML/MIN/1.73M2 (ref 60–?)
EOSINOPHIL # BLD AUTO: 0.06 X10(3) UL (ref 0–0.7)
EOSINOPHIL NFR BLD AUTO: 1 %
ERYTHROCYTE [DISTWIDTH] IN BLOOD BY AUTOMATED COUNT: 13.1 %
FASTING STATUS PATIENT QL REPORTED: YES
GLOBULIN PLAS-MCNC: 3.9 G/DL (ref 2.8–4.4)
GLUCOSE BLD-MCNC: 90 MG/DL (ref 70–99)
HCT VFR BLD AUTO: 45.3 %
HGB BLD-MCNC: 14.7 G/DL
IMM GRANULOCYTES # BLD AUTO: 0.02 X10(3) UL (ref 0–1)
IMM GRANULOCYTES NFR BLD: 0.3 %
LYMPHOCYTES # BLD AUTO: 1.49 X10(3) UL (ref 1–4)
LYMPHOCYTES NFR BLD AUTO: 23.7 %
MCH RBC QN AUTO: 29.5 PG (ref 26–34)
MCHC RBC AUTO-ENTMCNC: 32.5 G/DL (ref 31–37)
MCV RBC AUTO: 90.8 FL
MONOCYTES # BLD AUTO: 0.6 X10(3) UL (ref 0.1–1)
MONOCYTES NFR BLD AUTO: 9.5 %
NEUTROPHILS # BLD AUTO: 4.1 X10 (3) UL (ref 1.5–7.7)
NEUTROPHILS # BLD AUTO: 4.1 X10(3) UL (ref 1.5–7.7)
NEUTROPHILS NFR BLD AUTO: 65.2 %
OSMOLALITY SERPL CALC.SUM OF ELEC: 290 MOSM/KG (ref 275–295)
PLATELET # BLD AUTO: 227 10(3)UL (ref 150–450)
POTASSIUM SERPL-SCNC: 4 MMOL/L (ref 3.5–5.1)
PROT SERPL-MCNC: 7.4 G/DL (ref 6.4–8.2)
RBC # BLD AUTO: 4.99 X10(6)UL
RH BLOOD TYPE: POSITIVE
SODIUM SERPL-SCNC: 140 MMOL/L (ref 136–145)
WBC # BLD AUTO: 6.3 X10(3) UL (ref 4–11)

## 2023-09-13 PROCEDURE — 36415 COLL VENOUS BLD VENIPUNCTURE: CPT

## 2023-09-13 PROCEDURE — 85025 COMPLETE CBC W/AUTO DIFF WBC: CPT

## 2023-09-13 PROCEDURE — 80053 COMPREHEN METABOLIC PANEL: CPT

## 2023-09-13 PROCEDURE — 86920 COMPATIBILITY TEST SPIN: CPT

## 2023-09-13 PROCEDURE — 86850 RBC ANTIBODY SCREEN: CPT

## 2023-09-13 PROCEDURE — 87635 SARS-COV-2 COVID-19 AMP PRB: CPT

## 2023-09-13 PROCEDURE — 86901 BLOOD TYPING SEROLOGIC RH(D): CPT

## 2023-09-13 PROCEDURE — 86900 BLOOD TYPING SEROLOGIC ABO: CPT

## 2023-09-14 ENCOUNTER — ANESTHESIA EVENT (OUTPATIENT)
Dept: CARDIAC SURGERY | Facility: HOSPITAL | Age: 78
End: 2023-09-14
Payer: MEDICARE

## 2023-09-14 LAB — SARS-COV-2 RNA RESP QL NAA+PROBE: NOT DETECTED

## 2023-09-15 ENCOUNTER — ANESTHESIA (OUTPATIENT)
Dept: CARDIAC SURGERY | Facility: HOSPITAL | Age: 78
End: 2023-09-15
Payer: MEDICARE

## 2023-09-15 ENCOUNTER — HOSPITAL ENCOUNTER (INPATIENT)
Facility: HOSPITAL | Age: 78
LOS: 3 days | Discharge: HOME | End: 2023-09-18
Attending: THORACIC SURGERY (CARDIOTHORACIC VASCULAR SURGERY) | Admitting: THORACIC SURGERY (CARDIOTHORACIC VASCULAR SURGERY)
Payer: MEDICARE

## 2023-09-15 DIAGNOSIS — Z01.818 PRE-OP TESTING: ICD-10-CM

## 2023-09-15 DIAGNOSIS — C34.32 PRIMARY ADENOCARCINOMA OF LOWER LOBE OF LEFT LUNG (HCC): Primary | ICD-10-CM

## 2023-09-15 PROCEDURE — 07B74ZZ EXCISION OF THORAX LYMPHATIC, PERCUTANEOUS ENDOSCOPIC APPROACH: ICD-10-PCS | Performed by: THORACIC SURGERY (CARDIOTHORACIC VASCULAR SURGERY)

## 2023-09-15 PROCEDURE — 0BBB4ZZ EXCISION OF LEFT LOWER LOBE BRONCHUS, PERCUTANEOUS ENDOSCOPIC APPROACH: ICD-10-PCS | Performed by: THORACIC SURGERY (CARDIOTHORACIC VASCULAR SURGERY)

## 2023-09-15 PROCEDURE — 88313 SPECIAL STAINS GROUP 2: CPT | Performed by: THORACIC SURGERY (CARDIOTHORACIC VASCULAR SURGERY)

## 2023-09-15 PROCEDURE — 0BJ08ZZ INSPECTION OF TRACHEOBRONCHIAL TREE, VIA NATURAL OR ARTIFICIAL OPENING ENDOSCOPIC: ICD-10-PCS | Performed by: THORACIC SURGERY (CARDIOTHORACIC VASCULAR SURGERY)

## 2023-09-15 PROCEDURE — 88305 TISSUE EXAM BY PATHOLOGIST: CPT | Performed by: THORACIC SURGERY (CARDIOTHORACIC VASCULAR SURGERY)

## 2023-09-15 PROCEDURE — 88309 TISSUE EXAM BY PATHOLOGIST: CPT | Performed by: THORACIC SURGERY (CARDIOTHORACIC VASCULAR SURGERY)

## 2023-09-15 PROCEDURE — 88342 IMHCHEM/IMCYTCHM 1ST ANTB: CPT | Performed by: THORACIC SURGERY (CARDIOTHORACIC VASCULAR SURGERY)

## 2023-09-15 PROCEDURE — 0BTJ4ZZ RESECTION OF LEFT LOWER LUNG LOBE, PERCUTANEOUS ENDOSCOPIC APPROACH: ICD-10-PCS | Performed by: THORACIC SURGERY (CARDIOTHORACIC VASCULAR SURGERY)

## 2023-09-15 PROCEDURE — 88341 IMHCHEM/IMCYTCHM EA ADD ANTB: CPT | Performed by: THORACIC SURGERY (CARDIOTHORACIC VASCULAR SURGERY)

## 2023-09-15 RX ORDER — OXYCODONE HYDROCHLORIDE 10 MG/1
10 TABLET ORAL EVERY 4 HOURS PRN
Status: DISCONTINUED | OUTPATIENT
Start: 2023-09-15 | End: 2023-09-18

## 2023-09-15 RX ORDER — HYDROMORPHONE HYDROCHLORIDE 1 MG/ML
0.8 INJECTION, SOLUTION INTRAMUSCULAR; INTRAVENOUS; SUBCUTANEOUS EVERY 2 HOUR PRN
Status: DISCONTINUED | OUTPATIENT
Start: 2023-09-15 | End: 2023-09-18

## 2023-09-15 RX ORDER — CEFAZOLIN SODIUM/WATER 2 G/20 ML
SYRINGE (ML) INTRAVENOUS AS NEEDED
Status: DISCONTINUED | OUTPATIENT
Start: 2023-09-15 | End: 2023-09-15 | Stop reason: SURG

## 2023-09-15 RX ORDER — HYDROMORPHONE HYDROCHLORIDE 1 MG/ML
0.4 INJECTION, SOLUTION INTRAMUSCULAR; INTRAVENOUS; SUBCUTANEOUS EVERY 5 MIN PRN
Status: ACTIVE | OUTPATIENT
Start: 2023-09-15 | End: 2023-09-15

## 2023-09-15 RX ORDER — EPHEDRINE SULFATE 50 MG/ML
INJECTION INTRAVENOUS AS NEEDED
Status: DISCONTINUED | OUTPATIENT
Start: 2023-09-15 | End: 2023-09-15 | Stop reason: SURG

## 2023-09-15 RX ORDER — OXYCODONE HYDROCHLORIDE 5 MG/1
5 TABLET ORAL EVERY 4 HOURS PRN
Qty: 30 TABLET | Refills: 0 | Status: SHIPPED | OUTPATIENT
Start: 2023-09-15 | End: 2023-10-15

## 2023-09-15 RX ORDER — ACETAMINOPHEN 10 MG/ML
1000 INJECTION, SOLUTION INTRAVENOUS EVERY 6 HOURS
Status: DISPENSED | OUTPATIENT
Start: 2023-09-15 | End: 2023-09-17

## 2023-09-15 RX ORDER — DONEPEZIL HYDROCHLORIDE 5 MG/1
10 TABLET, FILM COATED ORAL NIGHTLY
Status: DISCONTINUED | OUTPATIENT
Start: 2023-09-16 | End: 2023-09-18

## 2023-09-15 RX ORDER — ACETAMINOPHEN 10 MG/ML
INJECTION, SOLUTION INTRAVENOUS AS NEEDED
Status: DISCONTINUED | OUTPATIENT
Start: 2023-09-15 | End: 2023-09-15 | Stop reason: SURG

## 2023-09-15 RX ORDER — ONDANSETRON 2 MG/ML
4 INJECTION INTRAMUSCULAR; INTRAVENOUS EVERY 6 HOURS PRN
Status: DISCONTINUED | OUTPATIENT
Start: 2023-09-15 | End: 2023-09-18

## 2023-09-15 RX ORDER — HYDROMORPHONE HYDROCHLORIDE 1 MG/ML
INJECTION, SOLUTION INTRAMUSCULAR; INTRAVENOUS; SUBCUTANEOUS
Status: COMPLETED
Start: 2023-09-15 | End: 2023-09-15

## 2023-09-15 RX ORDER — POLYETHYLENE GLYCOL 3350 17 G/17G
17 POWDER, FOR SOLUTION ORAL DAILY PRN
Status: DISCONTINUED | OUTPATIENT
Start: 2023-09-15 | End: 2023-09-18

## 2023-09-15 RX ORDER — ROCURONIUM BROMIDE 10 MG/ML
INJECTION, SOLUTION INTRAVENOUS AS NEEDED
Status: DISCONTINUED | OUTPATIENT
Start: 2023-09-15 | End: 2023-09-15 | Stop reason: SURG

## 2023-09-15 RX ORDER — BUPIVACAINE HYDROCHLORIDE AND EPINEPHRINE 2.5; 5 MG/ML; UG/ML
INJECTION, SOLUTION EPIDURAL; INFILTRATION; INTRACAUDAL; PERINEURAL AS NEEDED
Status: DISCONTINUED | OUTPATIENT
Start: 2023-09-15 | End: 2023-09-15 | Stop reason: HOSPADM

## 2023-09-15 RX ORDER — BISACODYL 10 MG
10 SUPPOSITORY, RECTAL RECTAL
Status: DISCONTINUED | OUTPATIENT
Start: 2023-09-15 | End: 2023-09-18

## 2023-09-15 RX ORDER — ONDANSETRON 2 MG/ML
INJECTION INTRAMUSCULAR; INTRAVENOUS AS NEEDED
Status: DISCONTINUED | OUTPATIENT
Start: 2023-09-15 | End: 2023-09-15 | Stop reason: SURG

## 2023-09-15 RX ORDER — METOCLOPRAMIDE HYDROCHLORIDE 5 MG/ML
10 INJECTION INTRAMUSCULAR; INTRAVENOUS EVERY 8 HOURS PRN
Status: DISCONTINUED | OUTPATIENT
Start: 2023-09-15 | End: 2023-09-18

## 2023-09-15 RX ORDER — SENNOSIDES 8.6 MG
17.2 TABLET ORAL NIGHTLY PRN
Status: DISCONTINUED | OUTPATIENT
Start: 2023-09-15 | End: 2023-09-18

## 2023-09-15 RX ORDER — OXYCODONE HYDROCHLORIDE 5 MG/1
5 TABLET ORAL EVERY 4 HOURS PRN
Status: DISCONTINUED | OUTPATIENT
Start: 2023-09-15 | End: 2023-09-18

## 2023-09-15 RX ORDER — CALCIUM CARBONATE 500 MG/1
1000 TABLET, CHEWABLE ORAL 3 TIMES DAILY PRN
Status: DISCONTINUED | OUTPATIENT
Start: 2023-09-15 | End: 2023-09-18

## 2023-09-15 RX ORDER — SODIUM CHLORIDE 9 MG/ML
INJECTION, SOLUTION INTRAVENOUS CONTINUOUS PRN
Status: DISCONTINUED | OUTPATIENT
Start: 2023-09-15 | End: 2023-09-15 | Stop reason: SURG

## 2023-09-15 RX ORDER — FLUDROCORTISONE ACETATE 0.1 MG/1
0.1 TABLET ORAL ONCE
Status: DISCONTINUED | OUTPATIENT
Start: 2023-09-15 | End: 2023-09-15

## 2023-09-15 RX ORDER — METOCLOPRAMIDE HYDROCHLORIDE 5 MG/ML
10 INJECTION INTRAMUSCULAR; INTRAVENOUS ONCE AS NEEDED
Status: ACTIVE | OUTPATIENT
Start: 2023-09-15 | End: 2023-09-15

## 2023-09-15 RX ORDER — ENEMA 19; 7 G/133ML; G/133ML
1 ENEMA RECTAL ONCE AS NEEDED
Status: DISCONTINUED | OUTPATIENT
Start: 2023-09-15 | End: 2023-09-18

## 2023-09-15 RX ORDER — CEFAZOLIN SODIUM/WATER 2 G/20 ML
2 SYRINGE (ML) INTRAVENOUS EVERY 8 HOURS
Status: COMPLETED | OUTPATIENT
Start: 2023-09-15 | End: 2023-09-16

## 2023-09-15 RX ORDER — ONDANSETRON 2 MG/ML
4 INJECTION INTRAMUSCULAR; INTRAVENOUS ONCE AS NEEDED
Status: ACTIVE | OUTPATIENT
Start: 2023-09-15 | End: 2023-09-15

## 2023-09-15 RX ORDER — NALOXONE HYDROCHLORIDE 0.4 MG/ML
80 INJECTION, SOLUTION INTRAMUSCULAR; INTRAVENOUS; SUBCUTANEOUS AS NEEDED
Status: ACTIVE | OUTPATIENT
Start: 2023-09-15 | End: 2023-09-15

## 2023-09-15 RX ORDER — HYDROMORPHONE HYDROCHLORIDE 1 MG/ML
0.4 INJECTION, SOLUTION INTRAMUSCULAR; INTRAVENOUS; SUBCUTANEOUS EVERY 2 HOUR PRN
Status: DISCONTINUED | OUTPATIENT
Start: 2023-09-15 | End: 2023-09-18

## 2023-09-15 RX ORDER — LIDOCAINE HYDROCHLORIDE 10 MG/ML
INJECTION, SOLUTION EPIDURAL; INFILTRATION; INTRACAUDAL; PERINEURAL AS NEEDED
Status: DISCONTINUED | OUTPATIENT
Start: 2023-09-15 | End: 2023-09-15 | Stop reason: SURG

## 2023-09-15 RX ORDER — FLUDROCORTISONE ACETATE 0.1 MG/1
0.1 TABLET ORAL DAILY
Status: DISCONTINUED | OUTPATIENT
Start: 2023-09-15 | End: 2023-09-18

## 2023-09-15 RX ORDER — HYDROCORTISONE 10 MG/1
10 TABLET ORAL 2 TIMES DAILY
Status: DISCONTINUED | OUTPATIENT
Start: 2023-09-15 | End: 2023-09-18

## 2023-09-15 RX ORDER — HEPARIN SODIUM 5000 [USP'U]/ML
5000 INJECTION, SOLUTION INTRAVENOUS; SUBCUTANEOUS EVERY 8 HOURS SCHEDULED
Status: DISCONTINUED | OUTPATIENT
Start: 2023-09-15 | End: 2023-09-18

## 2023-09-15 RX ORDER — SODIUM CHLORIDE 9 MG/ML
INJECTION, SOLUTION INTRAVENOUS CONTINUOUS
Status: DISCONTINUED | OUTPATIENT
Start: 2023-09-15 | End: 2023-09-16

## 2023-09-15 RX ADMIN — EPHEDRINE SULFATE 10 MG: 50 INJECTION INTRAVENOUS at 12:27:00

## 2023-09-15 RX ADMIN — ROCURONIUM BROMIDE 50 MG: 10 INJECTION, SOLUTION INTRAVENOUS at 12:00:00

## 2023-09-15 RX ADMIN — LIDOCAINE HYDROCHLORIDE 50 MG: 10 INJECTION, SOLUTION EPIDURAL; INFILTRATION; INTRACAUDAL; PERINEURAL at 11:58:00

## 2023-09-15 RX ADMIN — ROCURONIUM BROMIDE 20 MG: 10 INJECTION, SOLUTION INTRAVENOUS at 12:50:00

## 2023-09-15 RX ADMIN — CEFAZOLIN SODIUM/WATER 2 G: 2 G/20 ML SYRINGE (ML) INTRAVENOUS at 12:08:00

## 2023-09-15 RX ADMIN — ACETAMINOPHEN 1000 MG: 10 INJECTION, SOLUTION INTRAVENOUS at 13:22:00

## 2023-09-15 RX ADMIN — EPHEDRINE SULFATE 10 MG: 50 INJECTION INTRAVENOUS at 12:52:00

## 2023-09-15 RX ADMIN — EPHEDRINE SULFATE 10 MG: 50 INJECTION INTRAVENOUS at 12:43:00

## 2023-09-15 RX ADMIN — ONDANSETRON 4 MG: 2 INJECTION INTRAMUSCULAR; INTRAVENOUS at 13:23:00

## 2023-09-15 RX ADMIN — SODIUM CHLORIDE: 9 INJECTION, SOLUTION INTRAVENOUS at 11:54:00

## 2023-09-15 RX ADMIN — EPHEDRINE SULFATE 10 MG: 50 INJECTION INTRAVENOUS at 13:10:00

## 2023-09-15 NOTE — DISCHARGE INSTRUCTIONS
Thoracic Surgery Post-Operative Appointment     Follow up appointment scheduled: with Dr. Jen Ruiz on September 27th at 12:15pm located at the [facility]. Thank you. Thoracic Surgery Discharge Instructions     Pain Management  You will be sent home with a prescription for pain medicine. This will likely be a narcotic pain medicine such as Oxycodone or Norco (hydrocodone/acetaminophen). If no contraindications, start with extra strength acetaminophen (Tylenol) or ibuprofen (Advil/Motrin) scheduled, and use narcotics for breakthrough pain. Ice packs can be helpful as well for surface level, skin incision pain. - Take extra strength acetaminophen (Tylenol) 500 mg every 4 to 6 hours, as long as you have no known liver conditions.   - **Max dose for acetaminophen (Tylenol) is 4000 mg per day. If taking Norco, please note that each tab contains 325 mg of acetaminophen (Tylenol). - Unless you have kidney problems, ibuprofen 600 mg every 6 hours can be used along with Tylenol for additional pain control. Restrictions  Upon discharge home, do not get in an airplane or soak in a tub/pool until after your first follow up to see me in the office. No dietary restrictions. If taking prescription pain medications you may become  constipated. Fluids, fiber and over the counter stool softeners are helpful for this. Other than that, no activity restrictions. You should attempt to be as active as possible. What ever you feel up to doing, you can do. Walking is strongly encouraged. You may  drive (not within 4 hours of taking prescription pain medications). You may shower, washing incisions gently with soap and water. Exercises  Do range of motion exercises twice a day with the arm on the side of your operation. The  easiest is to [facility]" your hand up the wall with your fingers. Eventually you should be able to get your arm straight up over your head.     You will be sent home with your breathing \"toys\" -- like your incentive spirometer. Use  this frequently at home. 10 times per hour while awake, if possible. If watching TV, use it at  every commercial break. Follow-up Appointment  My office will reach out to you regarding a follow up appointment, if not already scheduled. Appointment will be approximately 1-2 weeks after discharge. If you are experiencing any of these symptoms, please call our office at [phone]. Office hours are Monday through Friday, 8 am to 4:30 pm.  If you are calling the office after hours, please call the Thoracic Surgery On-Call number [phone], and state that you are a patient from [facility].      - Persistent fevers of 101.5 or greater  - Worsening pain  - Worsening shortness of breath  - Signs of infection in your wound such as drainage, worsening of redness, swelling or     pain. - Anything else that concerns you.

## 2023-09-15 NOTE — INTERVAL H&P NOTE
Pre-op Diagnosis: adenocarcinoma or lower lobe left lung    The above referenced H&P was reviewed by Deanne Cancino PA-C on 9/15/2023, the patient was examined and no significant changes have occurred in the patient's condition since the H&P was performed. We discussed with the patient and/or legal representative the potential benefits, risks and side effects of this procedure; the likelihood of the patient achieving goals; and potential problems that might occur during recuperation. We discussed reasonable alternatives to the procedure, including risks, benefits and side effects related to the alternatives and risks related to not receiving this procedure. We will proceed with procedure as planned.

## 2023-09-15 NOTE — PLAN OF CARE
Received pt from [facility] 23, CT to left flank area, dressing, c/d/I; VSS, afevrile, received on room air. Yuko very positional, leaving until AM for blood draw. Gonzales in place with stat lock and red sticker intact.  at bedside, updated with plan of care. Able to swallow water and pills without difficulty. Refusing food at this time. Pain when received, see Emar.        Problem: Patient/Family Goals  Goal: Patient/Family Long Term Goal  Description: Patient's Long Term Goal: To go home    Interventions:  - Follow plan of care  - See additional Care Plan goals for specific interventions  Outcome: Progressing  Goal: Patient/Family Short Term Goal  Description: Patient's Short Term Goal: Get chest tube out    Interventions:   - follow plan of care  - See additional Care Plan goals for specific interventions  Outcome: Progressing     Problem: PAIN - ADULT  Goal: Verbalizes/displays adequate comfort level or patient's stated pain goal  Description: INTERVENTIONS:  - Encourage pt to monitor pain and request assistance  - Assess pain using appropriate pain scale  - Administer analgesics based on type and severity of pain and evaluate response  - Implement non-pharmacological measures as appropriate and evaluate response  - Consider cultural and social influences on pain and pain management  - Manage/alleviate anxiety  - Utilize distraction and/or relaxation techniques  - Monitor for opioid side effects  - Notify MD/LIP if interventions unsuccessful or patient reports new pain  - Anticipate increased pain with activity and pre-medicate as appropriate  Outcome: Progressing     Problem: RESPIRATORY - ADULT  Goal: Achieves optimal ventilation and oxygenation  Description: INTERVENTIONS:  - Assess for changes in respiratory status  - Assess for changes in mentation and behavior  - Position to facilitate oxygenation and minimize respiratory effort  - Oxygen supplementation based on oxygen saturation or ABGs  - Provide Smoking Cessation handout, if applicable  - Encourage broncho-pulmonary hygiene including cough, deep breathe, Incentive Spirometry  - Assess the need for suctioning and perform as needed  - Assess and instruct to report SOB or any respiratory difficulty  - Respiratory Therapy support as indicated  - Manage/alleviate anxiety  - Monitor for signs/symptoms of CO2 retention  Outcome: Progressing     Problem: SKIN/TISSUE INTEGRITY - ADULT  Goal: Skin integrity remains intact  Description: INTERVENTIONS  - Assess and document risk factors for pressure ulcer development  - Assess and document skin integrity  - Monitor for areas of redness and/or skin breakdown  - Initiate interventions, skin care algorithm/standards of care as needed  Outcome: Progressing  Goal: Incision(s), wounds(s) or drain site(s) healing without S/S of infection  Description: INTERVENTIONS:  - Assess and document risk factors for pressure ulcer development  - Assess and document skin integrity  - Assess and document dressing/incision, wound bed, drain sites and surrounding tissue  - Implement wound care per orders  - Initiate isolation precautions as appropriate  - Initiate Pressure Ulcer prevention bundle as indicated  Outcome: Progressing     Problem: MUSCULOSKELETAL - ADULT  Goal: Return mobility to safest level of function  Description: INTERVENTIONS:  - Assess patient stability and activity tolerance for standing, transferring and ambulating w/ or w/o assistive devices  - Assist with transfers and ambulation using safe patient handling equipment as needed  - Ensure adequate protection for wounds/incisions during mobilization  - Obtain PT/OT consults as needed  - Advance activity as appropriate  - Communicate ordered activity level and limitations with patient/family  Outcome: Progressing  Goal: Maintain proper alignment of affected body part  Description: INTERVENTIONS:  - Support and protect limb and body alignment per provider's orders  - Instruct and reinforce with patient and family use of appropriate assistive device and precautions (e.g. spinal or hip dislocation precautions)  Outcome: Progressing     Problem: Impaired Functional Mobility  Goal: Achieve highest/safest level of mobility/gait  Description: Interventions:  - Assess patient's functional ability and stability  - Promote increasing activity/tolerance for mobility and gait  - Educate and engage patient/family in tolerated activity level and precautions    Outcome: Progressing

## 2023-09-15 NOTE — OPERATIVE REPORT
[address]    PATIENT'S NAME: Julian Pennington   ATTENDING PHYSICIAN: [address] Willy Barron MD   OPERATING PHYSICIAN: Jaxson Cárdenas. Willy Barron MD   PATIENT ACCOUNT#:   [id number]    LOCATION:  [address] [place] [address]  MEDICAL RECORD #:   [id number]       YOB: 1945  ADMISSION DATE:       09/15/2023      OPERATION DATE:  09/15/2023    OPERATIVE REPORT      PREOPERATIVE DIAGNOSIS:  Left lower lobe lung cancer. POSTOPERATIVE DIAGNOSIS:  Left lower lobe lung cancer. PROCEDURE:    1. Flexible bronchoscopy. 2.   Left video-assisted thoracoscopic exploration. 3.   Left lower lobectomy. 4.   Mediastinal lymph node dissection. 5.   Multilevel intercostal nerve block. ASSISTANT:  Negro Alonso PA-C. ANESTHESIA:  General dual-lumen endotracheal anesthesia. ANESTHESIOLOGISTOuoneida Odonnell MD.    INDICATIONS:  The patient is an 78-year-old woman who was found to have a left lower lobe lung cancer. Staging workup revealed this to be localized, and she was noted to be a good surgical candidate. After discussion of the risks and benefits of surgery versus other treatments, she elected to move forward with left lower lobectomy. FINDINGS:  A lesion was found in the expected location and was removed by lower lobectomy. There are no gross signs of pleural or lymph node disease. OPERATIVE TECHNIQUE:  The patient was brought to the operating room, laid supine, and underwent general dual-lumen endotracheal anesthesia. I performed a flexible bronchoscopy. The trachea, mainstem bronchi, lobar and segmental bronchi were examined bilaterally. This was a normal bronchoscopy. No endobronchial lesions were seen. She was then placed in right lateral decubitus position. All pressure points were padded. She was prepped and draped in a sterile fashion. Time-out was performed to confirm patient, side, and site of surgery.   I made a 2 cm incision in the inframammary crease midclavicular line and entered the chest bluntly. A camera through this incision revealed I was safely in the chest space. I then made 2 additional incisions, which were my standard incisions for a left-sided chest exploration. An Porfirio wound retractor was placed through the superior-most incision for removal of specimens. I then explored the left chest.  There was no sign of pleural disease. I performed a multilevel intercostal nerve block. I used 60 mL of 0.25% Marcaine; 5 mL were injected in each interspace 2 through 9 under direct visualization with thoracoscope for postoperative pain control. I then palpated the lung. The only lesion appreciated was the left lower lobe in the expected location. I reflected the lung cephalad and took down the inferior pulmonary ligament. This was done with electrocautery. Within this was a level 9 lymph node, which was bluntly dissected free and sent for pathology. I then did dissection of the mediastinal pleural reflection anterior and posterior to the inferior pulmonary vein. These pleural reflections were taken down with electrocautery. I then did circumferential sharp dissection around the inferior pulmonary vein, followed by transection with the Ethicon 35 mm powered vascular stapler. Superior to the inferior vein, I found level 10 lymph nodes which were bluntly dissected free and sent for pathology. Working medially from here, I found level 11 lymph nodes overlying the left lower lobe bronchus. In removing this, I could see the pulmonary artery going to the left lower lobe. I did sharp dissection over this, creating a tunnel between the fissure and the artery. I placed the anvil of an Ethicon 45 mm [place] stapler within this tunnel and transected the fissural tissues using gold loads. In doing this, I uncovered the pulmonary artery going to the lower lobe. With this widely uncovered, I did circumferential sharp dissection around the basilar branch of the artery. Once I was around it, I created a tunnel with a right-angle clamp, followed by transection with the powered vascular stapler. I repeated this process with the superior segmental branch of the artery. It was likewise circumferentially dissected and transected with the powered vascular stapler. After doing this, I found additional lymph nodes, both level 11 and level 12 lymph nodes. These were bluntly dissected free and sent for pathology. I then transected the left lower lobe bronchus using a gold load of the 45 mm stapler. Finally I completed the fissure with additional gold load firings of the 45 mm stapler. This freed up the entirety of the left lower lobe, which was then placed in an anchor bag and removed from the chest.  I then continued my lymph node dissection. I did blunt dissection along the inferior edge of the left lower lobe bronchus, following it medially to the subcarinal space. There I found level 7 lymph nodes, which were bluntly dissected free and sent for pathology. I left a Surgicel at this site for additional hemostasis. I then reflected the lung posteriorly and opened up the mediastinal pleura overlying the left main pulmonary artery. Overlying this were level 5 lymph nodes, which were bluntly dissected free and sent for pathology. I then irrigated with warm water irrigation. I checked for a bronchial stump air leak and for bleeding. There was no air leak. Small sites of oozing were addressed with electrocautery and a Surgicel in the hilum. After this was done, I did not see any other significant bleeding. I placed a 28-Czech chest tube posteriorly and secured it using 0 silk sutures. Finally, I asked the anesthesiologist to reinflate the lung. It reinflated well and filled the chest space. I then withdrew my camera and all instruments and closed the remaining incisions in 3 layers with 2 layers of 2-0 Vicryl and 1 of 4-0 Monocryl. The patient tolerated the procedure well. I was present for the entirety of the procedure. SPECIMENS:  Level 5, 7, 9, 10, 11, and 12 lymph nodes; left lower lobe. DRAINS:  A 28-Belarusian chest tube x1. IMPLANTS:  None. ESTIMATED BLOOD LOSS:  50 mL. COMPLICATIONS:  None. CONDITION:  Stable, will recover in the ICU. Dictated By Lizette Pickens MD  d: 09/15/2023 13:43:40  t: 09/15/2023 16:27:30  [facility] [id number]  JLL/

## 2023-09-15 NOTE — BRIEF OP NOTE
Pre-Operative Diagnosis: adenocarcinoma or lower lobe left lung     Post-Operative Diagnosis: adenocarcinoma or lower lobe left lung      Procedure Performed:   FLEXIBLE BRONCHOSCOPY, LEFT VIDEO-ASSISTED THORACOSCOPIC LOWER LOBECTOMY, MEDIASTINAL LYMPH NODE DISSECTION    Surgeon(s) and Role:     * Shyann Sanchez MD - Primary    Assistant(s):  PA:  Liza Chatman     Surgical Findings: as expected     Specimen:   ID Type Source Tests Collected by Time Destination   1 : Left Lung Level 9 Tissue Lymph node SURGICAL PATHOLOGY TISSUE Shyann Sanchez MD 9/15/2023 1234    2 : Left Lung Level 10 Tissue Lymph node SURGICAL PATHOLOGY SULAIMAN Sanchez MD 9/15/2023 1239    3 : Left Lung Level 11 Tissue Lymph node SURGICAL PATHOLOGY SULAIMAN Sanchez MD 9/15/2023 1240    4 : Left Lung Level 12 Tissue Lymph node SURGICAL PATHOLOGY TISSUE Shyann Sanchez MD 9/15/2023 1255    5 : Left Lower Lobe Tissue Lung left SURGICAL PATHOLOGY TISSUE Shyann Sanchez MD 9/15/2023 1302    6 : Left Lung Level 7 Tissue Lymph node SURGICAL PATHOLOGY SULAIMAN Sanchez MD 9/15/2023 1307    7 : Left Lung Level 5 Tissue Lymph node SURGICAL PATHOLOGY SULAIMAN Sanchez MD 9/15/2023 1315      Estimated Blood Loss: Blood Output: 50 mL (9/15/2023  1:16 PM)    Disposition: CCU    Janet Gonzalez PA-C  9/15/2023  1:55 PM

## 2023-09-16 PROCEDURE — 97530 THERAPEUTIC ACTIVITIES: CPT

## 2023-09-16 PROCEDURE — 97165 OT EVAL LOW COMPLEX 30 MIN: CPT

## 2023-09-16 NOTE — PLAN OF CARE
Assumed care of patient at approximately 0730. Patient is alert and oriented x4 and oxygenating on 94% of oxygen. Patient has an intermittent leak in the chest tube. Chest tube is on water seat per thoracic surgery. Patient up to chair and ambulating. Patient was able to maintain SBP parameters between .

## 2023-09-16 NOTE — PLAN OF CARE
Pt. Is forgetful, oriented to person and place, she takes aricept for memory. Resting in bed, left lateral chest tube to -20cm suction. Medicated with scheduled ofirmev, pt. Declines further pain medication. Arterial waveform flat and unable to draw blood, line removed at 2330.

## 2023-09-17 PROCEDURE — 97161 PT EVAL LOW COMPLEX 20 MIN: CPT

## 2023-09-17 PROCEDURE — 97530 THERAPEUTIC ACTIVITIES: CPT

## 2023-09-17 RX ORDER — MELATONIN
3 NIGHTLY PRN
Status: DISCONTINUED | OUTPATIENT
Start: 2023-09-17 | End: 2023-09-18

## 2023-09-17 NOTE — PHYSICAL THERAPY NOTE
PHYSICAL THERAPY EVALUATION - INPATIENT     Room Number: 0506/8978-F  Evaluation Date: 9/17/2023  Type of Evaluation: Initial  Physician Order: PT Eval and Treat    Presenting Problem: s/p VATs 9/15  Co-Morbidities : Addisons SIADH  Reason for Therapy: Mobility Dysfunction and Discharge Planning    History related to current admission: Patient is a [de-identified]year old female admitted on 9/15/2023 from home for VATS due to adenocarcinoma. Pt diagnosed with history of dementia        ASSESSMENT   In this PT evaluation, the patient presents with the following impairments decreased cognition & safety awareness, endurance and balance. These impairments and comorbidities manifest themselves as functional limitations in independent bed mobility, transfers, and gait and stairs. The patient is below baseline and would benefit from skilled inpatient PT to address the above deficits to assist patient in returning to prior to level of function. Functional outcome measures completed include AMPA. The AM-PAC '6-Clicks' Inpatient Basic Mobility Short Form was completed and this patient is demonstrating a Approx Degree of Impairment: 46.58%  degree of impairment in mobility. Research supports that patients with this level of impairment may benefit from return home with 24 hour supervision. DISCHARGE RECOMMENDATIONS  PT Discharge Recommendations: 24 hour care/supervision    PLAN  PT Treatment Plan: Bed mobility; Body mechanics; Endurance; Energy conservation; Family education;Gait training;Stair training;Transfer training;Balance training  Rehab Potential : Good  Frequency (Obs): 3-5x/week  Number of Visits to Meet Established Goals: 2      CURRENT GOALS    Goal #1 Patient is able to demonstrate supine - sit EOB @ level: independent     Goal #2 Patient is able to demonstrate transfers Sit to/from Stand at assistance level: independent     Goal #3 Patient is able to ambulate 250 feet with assist device: walker - rolling at assistance level: supervision     Goal #4 Ascend/descend fight of stairs with rail and supervision   Goal #5    Goal #6    Goal Comments: Goals established on 2023    HOME SITUATION  Type of Home: House   Home Layout: Multi-level        Stairs to Bedroom:  (flight)  Railing: Yes    Lives With: Spouse     Patient Owned Equipment: Rolling walker  Patient Regularly Uses: Glasses    Prior Level of Clare: PTA completely Ind per patient and  confirms upon arrival reports \"she just has dementia\"    SUBJECTIVE  \"I need to get to [facility]\" fixated on [facility] and looking for services, RN reports difficulty returning to room after last walk      OBJECTIVE  Precautions: Bed/chair alarm; Chest tube (posey seat belt)  Fall Risk: High fall risk    WEIGHT BEARING RESTRICTION  Weight Bearing Restriction: None                PAIN ASSESSMENT  Ratin  Location: denies       COGNITION  Overall Cognitive Status:  Impaired  Orientation Level:  oriented to person  Following Commands:  follows multi-step commands inconsistently, follows one step commands with increased time, and follows one step commands with repetition  Safety Judgement:  decreased awareness of need for assistance and decreased awareness of need for safety    RANGE OF MOTION AND STRENGTH ASSESSMENT      Lower extremity ROM is within functional limits     Lower extremity strength is within functional limits       BALANCE  Static Sitting: Good  Dynamic Sitting: Fair +  Static Standing: Fair  Dynamic Standing: Fair -    ADDITIONAL TESTS                                    ACTIVITY TOLERANCE                         O2 WALK       NEUROLOGICAL FINDINGS                        AM-PAC '6-Clicks' INPATIENT SHORT FORM - BASIC MOBILITY  How much difficulty does the patient currently have. ..   Patient Difficulty: Turning over in bed (including adjusting bedclothes, sheets and blankets)?: A Little   Patient Difficulty: Sitting down on and standing up from a chair with arms (e.g., wheelchair, bedside commode, etc.): A Little   Patient Difficulty: Moving from lying on back to sitting on the side of the bed?: A Little   How much help from another person does the patient currently need. .. Help from Another: Moving to and from a bed to a chair (including a wheelchair)?: A Little   Help from Another: Need to walk in hospital room?: A Little   Help from Another: Climbing 3-5 steps with a railing?: A Little       AM-PAC Score:  Raw Score: 18   Approx Degree of Impairment: 46.58%   Standardized Score (AM-PAC Scale): 43.63   CMS Modifier (G-Code): CK    FUNCTIONAL ABILITY STATUS  Gait Assessment   Functional Mobility/Gait Assessment  Gait Assistance: Contact guard assist  Distance (ft): 150  Assistive Device: Rolling walker  Pattern: Within Functional Limits    Skilled Therapy Provided     Bed Mobility:  Rolling: NT  Supine to sit: NT   Sit to supine: nt     Transfer Mobility:  Sit to stand: CGA   Stand to sit: cga  Gait = cga    Therapist's Comments: Patient wanting to look for [facility] service upon arrival difficult to redirect. Transfers to stand to rw with cga and cues for safety. Gait strained with rw with cga,  arrived during gait training so patient transitioned to room more readily and  found online [employer] service for patient. Discussed with patient and  memory concerns /issues for return home and need for supervision. Exercise/Education Provided:  Gait training  Transfer training  Discharge planning    Patient End of Session: Up in chair;Needs met;Call light within reach;RN aware of session/findings; All patient questions and concerns addressed; Alarm set; Family present (posey seat belt)      Patient Evaluation Complexity Level:  History Moderate - 1 or 2 personal factors and/or co-morbidities   Examination of body systems Low - addressing 1-2 elements   Clinical Presentation Low - Stable   Clinical Decision Making Low - Stable       PT Session Time: 40 minutes  Gait Trainin minutes  Therapeutic Activity: 10 minutes

## 2023-09-17 NOTE — PLAN OF CARE
Pt. confused, takes aricept for memory issues. Up in chair sorting paper clips and folding towels to keep her occupied. Chair alarm on. Denies pain, scheduled ofirmev given. Left lateral chest tube to water seal. Titaling and intermittent air leak noted. While in bed pt. Frequently pulling off monitoring equipment and does not believe she is in the hospital. Occasionally becoming combative, ongoing attempts to reorient.

## 2023-09-17 NOTE — PROGRESS NOTES
Received patient at 1450. Patient is alert and orientated x1, orientated to self, disorientated to time, situation, and place. Lung sounds clear, diminished at base. Patient on room air, sinus rhythm on tele, no complain of chest pain. Abdomen is soft, non-tendered,bowel sounds are active in all four quadrants. L side Chest tube to water seal, dressing in place, clean, dry, and intact. Output from chest tube was 90, marked at 800. Patient sitting in the chair, chair alarm on, call light within reach.  at beside, questions are answered.

## 2023-09-18 VITALS
BODY MASS INDEX: 27.41 KG/M2 | SYSTOLIC BLOOD PRESSURE: 117 MMHG | HEIGHT: 67 IN | WEIGHT: 174.63 LBS | DIASTOLIC BLOOD PRESSURE: 76 MMHG | TEMPERATURE: 98 F | OXYGEN SATURATION: 99 % | RESPIRATION RATE: 19 BRPM | HEART RATE: 80 BPM

## 2023-09-18 LAB
BLOOD TYPE BARCODE: 9500
UNIT VOLUME: 350 ML

## 2023-09-18 RX ORDER — HYDROCORTISONE 10 MG/1
10 TABLET ORAL
Status: DISCONTINUED | OUTPATIENT
Start: 2023-09-19 | End: 2023-09-18

## 2023-09-18 RX ORDER — HYDROCORTISONE 10 MG/1
10 TABLET ORAL
Status: DISCONTINUED | OUTPATIENT
Start: 2023-09-18 | End: 2023-09-18

## 2023-09-18 NOTE — PLAN OF CARE
Assumed patient care, patient alert and orientated x1. Patient orientated to self, disorientated to time, place, and situation. Patient on room air. Sinus rhythm on tele, denies chest pain. Chest tube to water seal with dressing in place, is clean, dry, and intact. Bed in lower position, call light within reach,  at beside, questions are answered. 1120: Dr. Mcintyre Every clamp the chest tube    POC: Possible remove chest tube and discharge to home later today.      Problem: Patient/Family Goals  Goal: Patient/Family Long Term Goal  Description: Patient's Long Term Goal: To go home    Interventions:  - Follow plan of care  - See additional Care Plan goals for specific interventions  Outcome: Progressing  Goal: Patient/Family Short Term Goal  Description: Patient's Short Term Goal: Get chest tube out    Interventions:   - follow plan of care  - See additional Care Plan goals for specific interventions  Outcome: Progressing

## 2023-09-18 NOTE — DISCHARGE SUMMARY
[facility]    Discharge Summary    [address] Patient Status:  Inpatient    1945 MRN [id number]   [facility] 8NE-A Attending Pily Cox., Carol Ann Matthews MD   Hosp Day # 3 PCP Eneida Benito MD     Date of Admission: 9/15/2023 Disposition: Home or Self Care     Date of Discharge: 2023    Admitting Diagnosis: adenocarcinoma or lower lobe left lung  Primary adenocarcinoma of lower lobe of left lung [facility])    Discharge Diagnosis: Patient Active Problem List:     Primary osteoarthritis of right knee           GLOBAL EXP 17 / RIGHT TKR / Anice Reji / EB      Primary adrenal insufficiency ([facility] [address]     History of SIADH     Thrombocytopenia, unspecified ([facility] [address]     Primary adenocarcinoma of lower lobe of left lung ([facility] [address]      Reason for Admission: adenocarcinoma or lower lobe left lung    Physical Exam:  General: well appearing female in no acute distress  HEENT: Normocephalic, PERRL, EOMI, no scleral icterus  Heart: regular rate and rhythm. Lungs: Normal respiratory effort. Equal chest rise bilaterally. Chest: wounds clean, dry and intact. No erythema, swelling, drainage or other signs of infection  Abdomen: Soft, Non-tender, non-distended. No hepatosplenomegaly noted. Extremities: No clubbing or cyanosis. No lateralizing weakness  Neuro: No gross cranial nerve defects, no loss of sensation  Psych: oriented to person, dementia at baseline     History of Present Illness: Patient is a [de-identified]year old female admitted for surgery for lung cancer on 9/15/2023    Hospital Course: Patient underwent surgery on 9/15/2023. She did well post-operatively. After an initial stay in the ICU, she went to the general floor. There she was able to get up and move around on their own and tolerate a general diet. Once the air leak had stopped and drainage was at acceptable levels, the chest tubes were pulled and the patient was sent home in good condition.     Final Pathology: pending    Consultations: Pulmonology, Internal Medicine    Procedures: FLEXIBLE BRONCHOSCOPY, LEFT VIDEO-ASSISTED THORACOSCOPIC LOWER LOBECTOMY, MEDIASTINAL LYMPH NODE DISSECTION    Complications: none    Discharge Condition: Good    Discharge Medications:      Discharge Medications        START taking these medications        Instructions Prescription details   oxyCODONE 5 MG Tabs      Take 1 tablet (5 mg total) by mouth every 4 (four) hours as needed for Pain. Stop taking on: October 15, 2023  Quantity: 30 tablet  Refills: 0            CHANGE how you take these medications        Instructions Prescription details   hydrocortisone 10 MG Tabs  Commonly known as: Cortef  What changed:   how much to take  how to take this  when to take this      TAKE 1 TABLET TWICE A DAY. IF SICK, DOUBLE THE DOSE   Quantity: 180 tablet  Refills: 1            CONTINUE taking these medications        Instructions Prescription details   Calcium Carbonate 600 MG Tabs      Take 1 tablet (600 mg total) by mouth daily. Refills: 0     cyanocobalamin 500 MCG Tabs  Commonly known as: Vitamin B12      Take 1 tablet (500 mcg total) by mouth daily. Refills: 0     donepezil 10 MG Tabs  Commonly known as: Aricept      Take 1 tablet (10 mg total) by mouth nightly. Refills: 0     fludrocortisone 0.1 MG Tabs  Commonly known as: Florinef      Take 1 tablet (0.1 mg total) by mouth daily. Quantity: 90 tablet  Refills: 1     MULTIVITAMIN ADULTS 50+ OR      Take 1 tablet by mouth daily. Refills: 0     Vitamin D 25 MCG (1000 UT) Tabs      Take 1,000 Units by mouth daily. Refills: 0               Where to Get Your Medications        These medications were sent to [facility] [phone], [phone]  [address] [place] [address]      Phone: [phone]   oxyCODONE 5 MG Tabs         Follow up Visits: Follow-up with Dr. Mcintyre Every in 1-2 weeks.      Other Discharge Instructions: see separate d/c instructions    Time Spent: 35 minutes    Marbella Rede [place]  9/18/2023

## 2023-09-18 NOTE — CM/SW NOTE
09/18/23 1100   CM/SW Referral Data   Referral Source Social Work (self-referral)   Reason for Referral Discharge planning   Informant Patient;Spouse/Significant Other   Medical Hx   Does patient have an established PCP? Yes   Patient Info   Patient's Current Mental Status at Time of Assessment Memory Impairments   Patient's [address]   Patient lives with Spouse/Significant other   Patient Status Prior to Admission   Independent with ADLs and Mobility Yes   Discharge Needs   Anticipated D/C needs No anticipated discharge needs       SW met with pt and spouse at bedside to assess for discharge needs. Pt is a [de-identified] y/o female who was admitted for lower left lobe adenocarcinoma. Pt presented as alert and oriented, however does exhibit memory impairments. Pt and spouse reside at home in [place]. Spouse confirms that he is home 24 hours per day to provide supervision to pt. PT recommending 24 hour care/supervision at discharge. No identified discharge needs at this time. All questions addressed.  &  to remain available and supportive for discharge planning needs.     YAMILA Grigsby, [facility]  Discharge Planner  [id number]

## 2023-09-18 NOTE — PLAN OF CARE
Assumed care of pt around [address], oriented to self only, disoriented to place, time, and situation  Pt more confused at nighttime -frequently out of bed, removing pulse ox and chest tube dressing (chest tube redressed, frequently reorienting patient)  L sided chest tube to water seal, chamber changed approximately 2330 due to being knocked over  R/A, NSR, no cardiac symptoms  PO oxycodone given for pain  Melatonin given for sleep  Continent of bladder and bowel  Fall precautions in place, bed alarm on  Pt updated on plan of care, all needs met at this time          Problem: Patient/Family Goals  Goal: Patient/Family Long Term Goal  Description: Patient's Long Term Goal: To go home    Interventions:  - Follow plan of care  - See additional Care Plan goals for specific interventions  Outcome: Progressing  Goal: Patient/Family Short Term Goal  Description: Patient's Short Term Goal: Get chest tube out    Interventions:   - follow plan of care  - See additional Care Plan goals for specific interventions  Outcome: Progressing     Problem: PAIN - ADULT  Goal: Verbalizes/displays adequate comfort level or patient's stated pain goal  Description: INTERVENTIONS:  - Encourage pt to monitor pain and request assistance  - Assess pain using appropriate pain scale  - Administer analgesics based on type and severity of pain and evaluate response  - Implement non-pharmacological measures as appropriate and evaluate response  - Consider cultural and social influences on pain and pain management  - Manage/alleviate anxiety  - Utilize distraction and/or relaxation techniques  - Monitor for opioid side effects  - Notify MD/LIP if interventions unsuccessful or patient reports new pain  - Anticipate increased pain with activity and pre-medicate as appropriate  Outcome: Progressing     Problem: RESPIRATORY - ADULT  Goal: Achieves optimal ventilation and oxygenation  Description: INTERVENTIONS:  - Assess for changes in respiratory status  - Assess for changes in mentation and behavior  - Position to facilitate oxygenation and minimize respiratory effort  - Oxygen supplementation based on oxygen saturation or ABGs  - Provide Smoking Cessation handout, if applicable  - Encourage broncho-pulmonary hygiene including cough, deep breathe, Incentive Spirometry  - Assess the need for suctioning and perform as needed  - Assess and instruct to report SOB or any respiratory difficulty  - Respiratory Therapy support as indicated  - Manage/alleviate anxiety  - Monitor for signs/symptoms of CO2 retention  Outcome: Progressing     Problem: SKIN/TISSUE INTEGRITY - ADULT  Goal: Skin integrity remains intact  Description: INTERVENTIONS  - Assess and document risk factors for pressure ulcer development  - Assess and document skin integrity  - Monitor for areas of redness and/or skin breakdown  - Initiate interventions, skin care algorithm/standards of care as needed  Outcome: Progressing  Goal: Incision(s), wounds(s) or drain site(s) healing without S/S of infection  Description: INTERVENTIONS:  - Assess and document risk factors for pressure ulcer development  - Assess and document skin integrity  - Assess and document dressing/incision, wound bed, drain sites and surrounding tissue  - Implement wound care per orders  - Initiate isolation precautions as appropriate  - Initiate Pressure Ulcer prevention bundle as indicated  Outcome: Progressing     Problem: MUSCULOSKELETAL - ADULT  Goal: Return mobility to safest level of function  Description: INTERVENTIONS:  - Assess patient stability and activity tolerance for standing, transferring and ambulating w/ or w/o assistive devices  - Assist with transfers and ambulation using safe patient handling equipment as needed  - Ensure adequate protection for wounds/incisions during mobilization  - Obtain PT/OT consults as needed  - Advance activity as appropriate  - Communicate ordered activity level and limitations with patient/family  Outcome: Progressing  Goal: Maintain proper alignment of affected body part  Description: INTERVENTIONS:  - Support and protect limb and body alignment per provider's orders  - Instruct and reinforce with patient and family use of appropriate assistive device and precautions (e.g. spinal or hip dislocation precautions)  Outcome: Progressing     Problem: Impaired Functional Mobility  Goal: Achieve highest/safest level of mobility/gait  Description: Interventions:  - Assess patient's functional ability and stability  - Promote increasing activity/tolerance for mobility and gait  - Educate and engage patient/family in tolerated activity level and precautions  Outcome: Progressing

## 2023-09-20 NOTE — PAYOR COMM NOTE
--------------  DISCHARGE REVIEW    Payor: Sherice Ren 673 #:  [id number]  Authorization Number: [id number]    Admit date: 9/15/23  Admit time:  10:21 AM  Discharge Date: 2023  3:30 PM     Admitting Physician: Kvng Sullivan MD  Attending Physician:  No att. providers found  Primary Care Physician: Jayesh Sharma MD          Discharge Summary Notes        Discharge Summary signed by Brina Odonnell PA-C at 2023  9:07 AM       Author: Brina Odonnell PA-C Specialty: Physician Assistant Surgical, CARDIOLOGY Author Type: Physician Assistant    Filed: 2023  9:07 AM Date of Service: 2023  2:09 PM Status: Signed    : Voncille Snellen (Physician Assistant)           [facility]    Discharge Summary    [address] Patient Status:  Inpatient    1945 MRN [id number]   [address] Attending Kvng Sullivan MD   Hosp Day # 3 PCP Regan Eagle MD     Date of Admission: 9/15/2023 Disposition: Home or Self Care     Date of Discharge: 2023    Admitting Diagnosis: adenocarcinoma or lower lobe left lung  Primary adenocarcinoma of lower lobe of left lung [facility])    Discharge Diagnosis: Patient Active Problem List:     Primary osteoarthritis of right knee           GLOBAL EXP 17 / RIGHT TKR / Frances Poles / EB      Primary adrenal insufficiency ([facility] [address]     History of SIADH     Thrombocytopenia, unspecified ([facility] [address]     Primary adenocarcinoma of lower lobe of left lung ([facility] [address]      Reason for Admission: adenocarcinoma or lower lobe left lung    Physical Exam:  General: well appearing female in no acute distress  HEENT: Normocephalic, PERRL, EOMI, no scleral icterus  Heart: regular rate and rhythm. Lungs: Normal respiratory effort. Equal chest rise bilaterally. Chest: wounds clean, dry and intact. No erythema, swelling, drainage or other signs of infection  Abdomen: Soft, Non-tender, non-distended.  No hepatosplenomegaly noted. Extremities: No clubbing or cyanosis. No lateralizing weakness  Neuro: No gross cranial nerve defects, no loss of sensation  Psych: oriented to person, dementia at baseline     History of Present Illness: Patient is a [de-identified]year old female admitted for surgery for lung cancer on 9/15/2023    Hospital Course: Patient underwent surgery on 9/15/2023. She did well post-operatively. After an initial stay in the ICU, she went to the general floor. There she was able to get up and move around on their own and tolerate a general diet. Once the air leak had stopped and drainage was at acceptable levels, the chest tubes were pulled and the patient was sent home in good condition. Final Pathology: pending    Consultations: Pulmonology, Internal Medicine    Procedures: FLEXIBLE BRONCHOSCOPY, LEFT VIDEO-ASSISTED THORACOSCOPIC LOWER LOBECTOMY, MEDIASTINAL LYMPH NODE DISSECTION    Complications: none    Discharge Condition: Good    Discharge Medications:      Discharge Medications        START taking these medications        Instructions Prescription details   oxyCODONE 5 MG Tabs      Take 1 tablet (5 mg total) by mouth every 4 (four) hours as needed for Pain. Stop taking on: October 15, 2023  Quantity: 30 tablet  Refills: 0            CHANGE how you take these medications        Instructions Prescription details   hydrocortisone 10 MG Tabs  Commonly known as: Cortef  What changed:   how much to take  how to take this  when to take this      TAKE 1 TABLET TWICE A DAY. IF SICK, DOUBLE THE DOSE   Quantity: 180 tablet  Refills: 1            CONTINUE taking these medications        Instructions Prescription details   Calcium Carbonate 600 MG Tabs      Take 1 tablet (600 mg total) by mouth daily. Refills: 0     cyanocobalamin 500 MCG Tabs  Commonly known as: Vitamin B12      Take 1 tablet (500 mcg total) by mouth daily.    Refills: 0     donepezil 10 MG Tabs  Commonly known as: Aricept      Take 1 tablet (10 mg total) by mouth nightly. Refills: 0     fludrocortisone 0.1 MG Tabs  Commonly known as: Florinef      Take 1 tablet (0.1 mg total) by mouth daily. Quantity: 90 tablet  Refills: 1     MULTIVITAMIN ADULTS 50+ OR      Take 1 tablet by mouth daily. Refills: 0     Vitamin D 25 MCG (1000 UT) Tabs      Take 1,000 Units by mouth daily. Refills: 0               Where to Get Your Medications        These medications were sent to [facility] [phone], [phone]  [address] [place] [address]      Phone: [phone]   oxyCODONE 5 MG Tabs         Follow up Visits: Follow-up with Dr. Katy Rogers in 1-2 weeks.      Other Discharge Instructions: see separate d/c instructions    Time Spent: 35 minutes    Roman Silva PA-C  9/18/2023      Electronically signed by Joanna Escalera PA-C on 9/19/2023  9:07 AM         REVIEWER COMMENTS

## 2023-09-27 ENCOUNTER — OFFICE VISIT (OUTPATIENT)
Dept: HEMATOLOGY/ONCOLOGY | Facility: HOSPITAL | Age: 78
End: 2023-09-27
Attending: THORACIC SURGERY (CARDIOTHORACIC VASCULAR SURGERY)
Payer: MEDICARE

## 2023-09-27 VITALS
TEMPERATURE: 97 F | SYSTOLIC BLOOD PRESSURE: 121 MMHG | DIASTOLIC BLOOD PRESSURE: 57 MMHG | OXYGEN SATURATION: 99 % | BODY MASS INDEX: 27.02 KG/M2 | HEART RATE: 63 BPM | WEIGHT: 172.19 LBS | RESPIRATION RATE: 16 BRPM | HEIGHT: 67.01 IN

## 2023-09-27 DIAGNOSIS — C34.32 PRIMARY ADENOCARCINOMA OF LOWER LOBE OF LEFT LUNG (HCC): Primary | ICD-10-CM

## 2023-09-27 PROCEDURE — 99211 OFF/OP EST MAY X REQ PHY/QHP: CPT

## 2025-02-20 ENCOUNTER — HOSPITAL ENCOUNTER (OUTPATIENT)
Facility: HOSPITAL | Age: 80
Setting detail: OBSERVATION
Discharge: HOME | End: 2025-02-23
Attending: EMERGENCY MEDICINE | Admitting: INTERNAL MEDICINE
Payer: MEDICARE

## 2025-02-20 DIAGNOSIS — R55 SYNCOPE AND COLLAPSE: Primary | ICD-10-CM

## 2025-02-20 DIAGNOSIS — K52.9 GASTROENTERITIS: ICD-10-CM

## 2025-02-20 LAB
ALBUMIN SERPL-MCNC: 4.9 G/DL (ref 3.2–4.8)
ALBUMIN/GLOB SERPL: 1.4 {RATIO} (ref 1–2)
ALP LIVER SERPL-CCNC: 59 U/L
ALT SERPL-CCNC: 18 U/L
ANION GAP SERPL CALC-SCNC: 14 MMOL/L (ref 0–18)
AST SERPL-CCNC: 33 U/L (ref ?–34)
BASOPHILS # BLD AUTO: 0.02 X10(3) UL (ref 0–0.2)
BASOPHILS NFR BLD AUTO: 0.2 %
BILIRUB SERPL-MCNC: 0.5 MG/DL (ref 0.2–1.1)
BUN BLD-MCNC: 21 MG/DL (ref 9–23)
CALCIUM BLD-MCNC: 10.1 MG/DL (ref 8.7–10.6)
CHLORIDE SERPL-SCNC: 104 MMOL/L (ref 98–112)
CO2 SERPL-SCNC: 22 MMOL/L (ref 21–32)
CREAT BLD-MCNC: 1.3 MG/DL
EGFRCR SERPLBLD CKD-EPI 2021: 41 ML/MIN/1.73M2 (ref 60–?)
EOSINOPHIL # BLD AUTO: 0.05 X10(3) UL (ref 0–0.7)
EOSINOPHIL NFR BLD AUTO: 0.4 %
ERYTHROCYTE [DISTWIDTH] IN BLOOD BY AUTOMATED COUNT: 13 %
FLUAV + FLUBV RNA SPEC NAA+PROBE: NEGATIVE
FLUAV + FLUBV RNA SPEC NAA+PROBE: NEGATIVE
GLOBULIN PLAS-MCNC: 3.6 G/DL (ref 2–3.5)
GLUCOSE BLD-MCNC: 109 MG/DL (ref 70–99)
HCT VFR BLD AUTO: 49.5 %
HGB BLD-MCNC: 16.4 G/DL
IMM GRANULOCYTES # BLD AUTO: 0.03 X10(3) UL (ref 0–1)
IMM GRANULOCYTES NFR BLD: 0.2 %
LYMPHOCYTES # BLD AUTO: 0.86 X10(3) UL (ref 1–4)
LYMPHOCYTES NFR BLD AUTO: 7 %
MCH RBC QN AUTO: 30 PG (ref 26–34)
MCHC RBC AUTO-ENTMCNC: 33.1 G/DL (ref 31–37)
MCV RBC AUTO: 90.5 FL
MONOCYTES # BLD AUTO: 0.3 X10(3) UL (ref 0.1–1)
MONOCYTES NFR BLD AUTO: 2.5 %
NEUTROPHILS # BLD AUTO: 10.95 X10 (3) UL (ref 1.5–7.7)
NEUTROPHILS # BLD AUTO: 10.95 X10(3) UL (ref 1.5–7.7)
NEUTROPHILS NFR BLD AUTO: 89.7 %
OSMOLALITY SERPL CALC.SUM OF ELEC: 294 MOSM/KG (ref 275–295)
PLATELET # BLD AUTO: 243 10(3)UL (ref 150–450)
POTASSIUM SERPL-SCNC: 4.2 MMOL/L (ref 3.5–5.1)
PROT SERPL-MCNC: 8.5 G/DL (ref 5.7–8.2)
RBC # BLD AUTO: 5.47 X10(6)UL
RSV RNA SPEC NAA+PROBE: NEGATIVE
SARS-COV-2 RNA RESP QL NAA+PROBE: NOT DETECTED
SODIUM SERPL-SCNC: 140 MMOL/L (ref 136–145)
TROPONIN I SERPL HS-MCNC: 3 NG/L
WBC # BLD AUTO: 12.2 X10(3) UL (ref 4–11)

## 2025-02-20 PROCEDURE — 80053 COMPREHEN METABOLIC PANEL: CPT | Performed by: EMERGENCY MEDICINE

## 2025-02-20 PROCEDURE — 96374 THER/PROPH/DIAG INJ IV PUSH: CPT

## 2025-02-20 PROCEDURE — 96361 HYDRATE IV INFUSION ADD-ON: CPT

## 2025-02-20 PROCEDURE — 93005 ELECTROCARDIOGRAM TRACING: CPT

## 2025-02-20 PROCEDURE — 93010 ELECTROCARDIOGRAM REPORT: CPT

## 2025-02-20 PROCEDURE — 84484 ASSAY OF TROPONIN QUANT: CPT | Performed by: EMERGENCY MEDICINE

## 2025-02-20 PROCEDURE — 99285 EMERGENCY DEPT VISIT HI MDM: CPT

## 2025-02-20 PROCEDURE — 85025 COMPLETE CBC W/AUTO DIFF WBC: CPT | Performed by: EMERGENCY MEDICINE

## 2025-02-20 PROCEDURE — 0241U SARS-COV-2/FLU A AND B/RSV BY PCR (GENEXPERT): CPT | Performed by: EMERGENCY MEDICINE

## 2025-02-20 RX ORDER — HEPARIN SODIUM 5000 [USP'U]/ML
5000 INJECTION, SOLUTION INTRAVENOUS; SUBCUTANEOUS EVERY 8 HOURS SCHEDULED
Status: DISCONTINUED | OUTPATIENT
Start: 2025-02-21 | End: 2025-02-21

## 2025-02-20 RX ORDER — SODIUM CHLORIDE 9 MG/ML
INJECTION, SOLUTION INTRAVENOUS CONTINUOUS
Status: ACTIVE | OUTPATIENT
Start: 2025-02-21 | End: 2025-02-21

## 2025-02-20 RX ORDER — ONDANSETRON 2 MG/ML
4 INJECTION INTRAMUSCULAR; INTRAVENOUS ONCE
Status: COMPLETED | OUTPATIENT
Start: 2025-02-20 | End: 2025-02-20

## 2025-02-21 ENCOUNTER — APPOINTMENT (OUTPATIENT)
Dept: CV DIAGNOSTICS | Facility: HOSPITAL | Age: 80
End: 2025-02-21
Attending: STUDENT IN AN ORGANIZED HEALTH CARE EDUCATION/TRAINING PROGRAM
Payer: MEDICARE

## 2025-02-21 PROBLEM — K52.9 GASTROENTERITIS: Status: ACTIVE | Noted: 2025-02-21

## 2025-02-21 LAB
ANION GAP SERPL CALC-SCNC: 10 MMOL/L (ref 0–18)
ATRIAL RATE: 86 BPM
BASOPHILS # BLD AUTO: 0.01 X10(3) UL (ref 0–0.2)
BASOPHILS NFR BLD AUTO: 0.1 %
BUN BLD-MCNC: 24 MG/DL (ref 9–23)
C DIFF GDH + TOXINS A+B STL QL IA.RAPID: NOT DETECTED
C DIFF GDH + TOXINS A+B STL QL IA.RAPID: NOT DETECTED
C DIFF TOX B STL QL: POSITIVE
C DIFF TOX B STL QL: POSITIVE
CALCIUM BLD-MCNC: 9.1 MG/DL (ref 8.7–10.6)
CHLORIDE SERPL-SCNC: 109 MMOL/L (ref 98–112)
CO2 SERPL-SCNC: 24 MMOL/L (ref 21–32)
CREAT BLD-MCNC: 1.17 MG/DL
EGFRCR SERPLBLD CKD-EPI 2021: 47 ML/MIN/1.73M2 (ref 60–?)
EOSINOPHIL # BLD AUTO: 0.05 X10(3) UL (ref 0–0.7)
EOSINOPHIL NFR BLD AUTO: 0.7 %
ERYTHROCYTE [DISTWIDTH] IN BLOOD BY AUTOMATED COUNT: 13.1 %
GLUCOSE BLD-MCNC: 119 MG/DL (ref 70–99)
HCT VFR BLD AUTO: 41.9 %
HGB BLD-MCNC: 13.7 G/DL
IMM GRANULOCYTES # BLD AUTO: 0.03 X10(3) UL (ref 0–1)
IMM GRANULOCYTES NFR BLD: 0.4 %
LYMPHOCYTES # BLD AUTO: 0.29 X10(3) UL (ref 1–4)
LYMPHOCYTES NFR BLD AUTO: 3.9 %
MAGNESIUM SERPL-MCNC: 2 MG/DL (ref 1.6–2.6)
MCH RBC QN AUTO: 29.6 PG (ref 26–34)
MCHC RBC AUTO-ENTMCNC: 32.7 G/DL (ref 31–37)
MCV RBC AUTO: 90.5 FL
MONOCYTES # BLD AUTO: 0.39 X10(3) UL (ref 0.1–1)
MONOCYTES NFR BLD AUTO: 5.2 %
NEUTROPHILS # BLD AUTO: 6.68 X10 (3) UL (ref 1.5–7.7)
NEUTROPHILS # BLD AUTO: 6.68 X10(3) UL (ref 1.5–7.7)
NEUTROPHILS NFR BLD AUTO: 89.7 %
OSMOLALITY SERPL CALC.SUM OF ELEC: 301 MOSM/KG (ref 275–295)
P AXIS: 80 DEGREES
P-R INTERVAL: 138 MS
PLATELET # BLD AUTO: 193 10(3)UL (ref 150–450)
POTASSIUM SERPL-SCNC: 3.9 MMOL/L (ref 3.5–5.1)
Q-T INTERVAL: 366 MS
QRS DURATION: 74 MS
QTC CALCULATION (BEZET): 437 MS
R AXIS: 32 DEGREES
RBC # BLD AUTO: 4.63 X10(6)UL
SODIUM SERPL-SCNC: 143 MMOL/L (ref 136–145)
T AXIS: 29 DEGREES
VENTRICULAR RATE: 86 BPM
WBC # BLD AUTO: 7.5 X10(3) UL (ref 4–11)

## 2025-02-21 PROCEDURE — 85025 COMPLETE CBC W/AUTO DIFF WBC: CPT

## 2025-02-21 PROCEDURE — 87507 IADNA-DNA/RNA PROBE TQ 12-25: CPT | Performed by: INTERNAL MEDICINE

## 2025-02-21 PROCEDURE — 93306 TTE W/DOPPLER COMPLETE: CPT | Performed by: STUDENT IN AN ORGANIZED HEALTH CARE EDUCATION/TRAINING PROGRAM

## 2025-02-21 PROCEDURE — 97530 THERAPEUTIC ACTIVITIES: CPT

## 2025-02-21 PROCEDURE — 87493 C DIFF AMPLIFIED PROBE: CPT | Performed by: INTERNAL MEDICINE

## 2025-02-21 PROCEDURE — 97165 OT EVAL LOW COMPLEX 30 MIN: CPT

## 2025-02-21 PROCEDURE — 80048 BASIC METABOLIC PNL TOTAL CA: CPT

## 2025-02-21 PROCEDURE — 97161 PT EVAL LOW COMPLEX 20 MIN: CPT

## 2025-02-21 PROCEDURE — 83735 ASSAY OF MAGNESIUM: CPT

## 2025-02-21 PROCEDURE — 87493 C DIFF AMPLIFIED PROBE: CPT | Performed by: STUDENT IN AN ORGANIZED HEALTH CARE EDUCATION/TRAINING PROGRAM

## 2025-02-21 PROCEDURE — 94760 N-INVAS EAR/PLS OXIMETRY 1: CPT

## 2025-02-21 RX ORDER — DONEPEZIL HYDROCHLORIDE 10 MG/1
10 TABLET, FILM COATED ORAL NIGHTLY
Status: DISCONTINUED | OUTPATIENT
Start: 2025-02-21 | End: 2025-02-23

## 2025-02-21 RX ORDER — FLUDROCORTISONE ACETATE 0.1 MG/1
0.1 TABLET ORAL DAILY
Status: DISCONTINUED | OUTPATIENT
Start: 2025-02-21 | End: 2025-02-23

## 2025-02-21 RX ORDER — HYDROCORTISONE 10 MG/1
20 TABLET ORAL 2 TIMES DAILY
Status: DISCONTINUED | OUTPATIENT
Start: 2025-02-21 | End: 2025-02-23

## 2025-02-21 RX ORDER — HYDROCORTISONE 10 MG/1
10 TABLET ORAL ONCE
Status: COMPLETED | OUTPATIENT
Start: 2025-02-21 | End: 2025-02-21

## 2025-02-21 RX ORDER — VANCOMYCIN HYDROCHLORIDE 125 MG/1
125 CAPSULE ORAL 4 TIMES DAILY
Status: DISCONTINUED | OUTPATIENT
Start: 2025-02-21 | End: 2025-02-22

## 2025-02-21 RX ORDER — SODIUM CHLORIDE 9 MG/ML
INJECTION, SOLUTION INTRAVENOUS CONTINUOUS
Status: DISCONTINUED | OUTPATIENT
Start: 2025-02-21 | End: 2025-02-23

## 2025-02-21 RX ORDER — MEMANTINE HYDROCHLORIDE 5 MG/1
5 TABLET ORAL 2 TIMES DAILY
Status: DISCONTINUED | OUTPATIENT
Start: 2025-02-21 | End: 2025-02-23

## 2025-02-21 RX ORDER — MEMANTINE HYDROCHLORIDE 5 MG/1
5 TABLET ORAL 2 TIMES DAILY
COMMUNITY

## 2025-02-21 RX ORDER — HYDROCORTISONE 10 MG/1
10 TABLET ORAL 2 TIMES DAILY
Status: DISCONTINUED | OUTPATIENT
Start: 2025-02-21 | End: 2025-02-21

## 2025-02-21 NOTE — PLAN OF CARE
Patient Instructions by Ben Figueroa MD at 17 05:10 PM     Author:  Ben Figueroa MD Service:  (none) Author Type:  Physician     Filed:  17 05:11 PM Encounter Date:  2017 Status:  Signed     :  Ben Figueroa MD (Physician)            Assessment and Plan:    Allergic rhinoconjunctivitis and chronic sinusitis  Plan: Allergen IT (injections) to tree/grass/ragweed/weed pollens, mold, cat, dog reached maintenance 2013; this has been very helpful.  Normal humoral immune testing 2015.     Singulair previously caused headache. Last sinus infection was 10/2016 prior to 2017 (current sinus infection).     We discussed appropriate allergen IT (immunotherapy or injections) duration which I consider 3-5 years at or near maintenance dosing (0.5 ml of the red vial).        · Continue xyzal 5 mg once nightly.  May add allegra 180 mg once daily as needed.  · Continue flonase 2 spray(s) each nostril once daily.  · Continue astelin 2 sprays each nostril once nightly as needed.  Use more consistently in the spring/fall.  · Continue allergen immunotherapy (injections) through 2018 (after 4 years on maintenance and when vial is set to ).   · Continue nasal saline irrigations once daily.        Bronchospasm (airway reactivity/inflammation)    Plan: Stable and well controlled from a clinical and lung function standpoint off daily medications. Low risk, mild and intermittent. Mostly triggered by sinus issues.      · Continue albuterol 2 puffs every 4 hours as needed.    Follow up: in a year with Cecilia Griffith, Nurse Practitioner         Revision History        User Key Date/Time User Provider Type Action    > [N/A] 17 05:11 PM Ben Figueroa MD Physician Sign             Pt up with SBA.  Frequent loose stools.  Specimen sent  for C diff  this am and again this afternoon per Dr. Cornejo.  Positive for C diff.  IVF's and clear liquid diet.  Pt unable to urinate this shift, bladder scan showed 600 ml in bladder.  Straight cath done at 1630, drained 600 ml.  Spouse at bedside, verbalized understandiing of POC.

## 2025-02-21 NOTE — OCCUPATIONAL THERAPY NOTE
OCCUPATIONAL THERAPY EVALUATION - INPATIENT    Room Number: 384/384-A  Evaluation Date: 2/21/2025     Type of Evaluation: Initial  Presenting Problem: Near syncope, N/V/D    Physician Order: IP Consult to Occupational Therapy  Reason for Therapy:  ADL/IADL Dysfunction and Discharge Planning    OCCUPATIONAL THERAPY ASSESSMENT   Patient is a 80 year old female admitted on 2/20/2025 from home with Presenting Problem: Near syncope, N/V/D.   Co-Morbidities : Lung ca, SIADH      Patient is currently functioning near baseline with  ADLs and mobility .  Prior to admission, patient's baseline is supervision without device.  Patient met all OT goals at supervision level.  Patient reports no further questions/concerns at this time.     Recommendations for nursing staff:   Transfers: SBA  Toileting location: Toilet    EVALUATION SESSION:  Patient at start of session: semi-supine     FUNCTIONAL TRANSFER ASSESSMENT  Sit to Stand: Edge of Bed  Edge of Bed: Stand-by Assist  Toilet Transfer: Stand-by Assist    BED MOBILITY  Supine to Sit : Supervision  Sit to Supine (OT): Supervision    BALANCE ASSESSMENT  Static Sitting: Supervision  Static Standing: Stand-by Assist    FUNCTIONAL ADL ASSESSMENT  LB Dressing Seated: Stand-by Assist    ACTIVITY TOLERANCE: BP monitored as follows:  138/119 semi-supine  138/101 seated EOB  160/144 standing (Pt had difficulty keeping arm relaxed in standing)  98/54 after ambulation in hallway  Pt consistently denied any dizziness/LH throughout session    COGNITION  Overall Cognitive Status:  Impaired and dementia  Arousal/Alertness:  appropriate responses to stimuli  Following Commands:  follows one step commands with increased time  Safety Judgement:  decreased awareness of need for safety    Upper Extremity:   ROM: within functional limits   Strength: is within functional limits   Coordination:  Gross motor: wfl  Fine motor: wfl    EDUCATION PROVIDED  Patient Education : Role of Occupational Therapy;  Plan of Care; Functional Transfer Techniques; Fall Prevention; Proper Body Mechanics  Patient's Response to Education: Verbalized Understanding  Family/Caregiver Education : Plan of Care; Role of Occupational Therapy; Functional Transfer Techniques; Fall Prevention; Proper Body Mechanics  Family/Caregiver's Response to Education: Verbalized Understanding    Equipment used: RW  Used walker initially d/t recent hypotension, however Pr not familiar with device and able to transition to no device for mobility    Therapist comments: Supportive spouse present and included in education re: safety and fall prevention with hypotension despite pt being asymptomatic.     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 Spouse provides supervision for safety d/t cognitive deficits.     SUBJECTIVE  \"I'm not dizzy.\"    PAIN ASSESSMENT  Ratin  Location: denies       OBJECTIVE  Precautions: Bed/chair alarm;Other (Comment) (contact + precautions)  Fall Risk: Standard fall risk    WEIGHT BEARING RESTRICTION       AM-PAC ‘6-Clicks’ Inpatient Daily Activity Short Form  -   Putting on and taking off regular lower body clothing?: A Little  -   Bathing (including washing, rinsing, drying)?: A Little  -   Toileting, which includes using toilet, bedpan or urinal? : A Little  -   Putting on and taking off regular upper body  clothing?: A Little  -   Taking care of personal grooming such as brushing teeth?: A Little  -   Eating meals?: None    AM-PAC Score:  Score: 19  Approx Degree of Impairment: 42.8%  Standardized Score (AM-PAC Scale): 40.22    ADDITIONAL TESTS     NEUROLOGICAL FINDINGS      PLAN   Patient has been evaluated and presents with no skilled Occupational Therapy needs at this time.  Patient discharged from Occupational Therapy services.  Please re-order if a new functional limitation presents during this admission.    OT Device Recommendations: None    Patient Evaluation Complexity Level:   Occupational Profile/Medical History LOW - Brief history including review of medical or therapy records    Specific performance deficits impacting engagement in ADL/IADL LOW  1 - 3 performance deficits    Client Assessment/Performance Deficits MODERATE - Comorbidities and min to mod modifications of tasks    Clinical Decision Making LOW - Analysis of occupational profile, problem-focused assessments, limited treatment options    Overall Complexity LOW     OT Session Time: 24 minutes  Therapeutic Activity: 10 minutes

## 2025-02-21 NOTE — PHYSICAL THERAPY NOTE
PHYSICAL THERAPY EVALUATION - INPATIENT     Room Number: 384/384-A  Evaluation Date: 2025  Type of Evaluation: Initial  Physician Order: PT Eval and Treat    Presenting Problem: syncope and collapse  Co-Morbidities : Lung ca, SIADH  Reason for Therapy: Mobility Dysfunction and Discharge Planning    PHYSICAL THERAPY ASSESSMENT   Patient is a 80 year old female admitted 2025 for syncope and collapse.   Patient is currently functioning at baseline with bed mobility, transfers, and gait. Prior to admission, patient's baseline is independent.     Patient discharged from skilled IP PT Services. Patient is safe to return home with no skilled PT needs.    PLAN  Patient has been evaluated and presents with no skilled Physical Therapy needs at this time.  Patient discharged from Physical Therapy services.  Please re-order if a new functional limitation presents during this admission.    PT Device Recommendation: None    GOALS  Patient was able to achieve the following goals ...    Patient was able to transfer At previous, functional level   Patient able to ambulate on level surfaces At previous, functional level     HOME SITUATION  Type of Home: House  Home Layout: Two level  Stairs to Enter : 2   Railing: No    Stairs to Bedroom: 14    Railing: Yes    Lives With: Spouse    Drives: No   Patient Regularly Uses: Glasses (owns RW)     Prior Level of [place]: Patient is a poor historian. History obtained from spouse. Patient lives with her spouse in a 2 story home. She is independent with mobility and does not use any assistive device. She owns RW. She is a retired teacher and enjoys sewing. Spouse provides supervision with all tasks.     SUBJECTIVE  \"I'm not dizzy\"    OBJECTIVE  Precautions: Bed/chair alarm;Other (Comment) (contact + precautions)  Fall Risk: Standard fall risk    WEIGHT BEARING RESTRICTION     PAIN ASSESSMENT  Ratin  Location: pt denies pain at this time       COGNITION  Overall Cognitive  Status:  WFL - within functional limits    RANGE OF MOTION AND STRENGTH ASSESSMENT  Upper extremity ROM and strength are within functional limits     Lower extremity ROM is within functional limits     Lower extremity strength is within functional limits     BALANCE  Static Sitting: Normal  Dynamic Sitting: Normal  Static Standing: Normal  Dynamic Standing: Normal    ADDITIONAL TESTS                                    ACTIVITY TOLERANCE                         O2 WALK       NEUROLOGICAL FINDINGS                        AM-PAC '6-Clicks' INPATIENT SHORT FORM - BASIC MOBILITY  How much difficulty does the patient currently have...  Patient Difficulty: Turning over in bed (including adjusting bedclothes, sheets and blankets)?: None   Patient Difficulty: Sitting down on and standing up from a chair with arms (e.g., wheelchair, bedside commode, etc.): None   Patient Difficulty: Moving from lying on back to sitting on the side of the bed?: None   How much help from another person does the patient currently need...   Help from Another: Moving to and from a bed to a chair (including a wheelchair)?: None   Help from Another: Need to walk in hospital room?: A Little   Help from Another: Climbing 3-5 steps with a railing?: A Little       AM-PAC Score:  Raw Score: 22   Approx Degree of Impairment: 20.91%   Standardized Score (AM-PAC Scale): 53.28   CMS Modifier (G-Code): CJ    FUNCTIONAL ABILITY STATUS  Gait Assessment   Functional Mobility/Gait Assessment  Gait Assistance: Supervision  Distance (ft): 150  Assistive Device: Rolling walker;None  Pattern: Within Functional Limits    Skilled Therapy Provided   Educated on importance of out of bed mobility and ambulation  Educated on orthostatic safety (no sudden position changes, supervision when toileting at night)    Bed mobility> sit to stand to RW> ambulated with RW 50 feet> ambulated independently 100 feet> upright in chair at end of session    *BP: (no dizziness reported)     Semi supine 138/119 *pt required frequent VC to keep arm still - continuous use of UE    Sitting /101 *pt required frequent VC to keep arm still - continuous use of UE    Standing 160/144 *pt required frequent VC to keep arm still - continuous use of UE    After ambulation 98/54    Bed Mobility:  Rolling: independent  Supine to sit: independent   Sit to supine: NT     Transfer Mobility:  Sit to stand: supervision to RW   Stand to sit: supervision  Gait = supervision with RW 50 feet progressing to independent with no assistive device 100 feet, gait pattern WNL, no loss of balance or gait deviation observed    Therapist's comments:RN gave clearance to see patient. Discussed role and goal of physical therapy in hospital setting.Spouse present during session. Pt in agreement to session.      Exercise/Education Provided:  Bed mobility  Body mechanics  Energy conservation  Functional activity tolerated  Gait training  Transfer training    Patient End of Session: Up in chair;Needs met;Call light within reach;RN aware of session/findings;All patient questions and concerns addressed;Hospital anti-slip socks;Alarm set;Family present    Patient Evaluation Complexity Level:  History Low - no personal factors and/or co-morbidities   Examination of body systems Low -  addressing 1-2 elements   Clinical Presentation Low- Stable   Clinical Decision Making Low Complexity       PT Session Time: 30 minutes  Therapeutic Activity: 15 minutes

## 2025-02-21 NOTE — CONSULTS
Infectious Disease Initial Consultation      Date of admission: 2/20/2025 10:00 PM     Date of service: 02/21/25 3:16 PM    Consult requested by: Mary Villanueva DO    Reason for consult: Question of C. difficile colitis    Chief complaint: Diarrhea, nausea and vomiting    History of present illness: Rona Lucas is a 80 year old female with history of Lane's disease, dementia, hyperlipidemia, SIADH, who presents to the hospital with sudden onset nausea and vomiting along with severe diarrhea and dehydration.  As per the , the patient became syncopal after a dose of due to her dehydration and so was taken to the emergency room for further care.    In the ED, the patient was afebrile, hemodynamically stable.  Labs revealed mild leukocytosis white count of 12.2 with a left shift.  However, that resolved.  BMP showed a creatinine 1.3.  LFTs were unremarkable.  C. difficile was positive by PCR; however, toxin was negative by EIA.  Due to concerns for C. difficile, the patient was started on p.o. vancomycin 125 mg 4 times a day.  She is still having some loose stools this morning.  Otherwise feeling better.  She was also started on stress dose steroids.    Review of systems:  All other components of the review of systems are negative, except those described in the history of present illness.     Past Medical History:    Benji's disease (HCC)    Baker's cyst of knee, right    Cancer (HCC)    Congenital obstruction of ureteropelvic junction    History of SIADH    Hyperlipidemia    Osteoarthritis    Primary adenocarcinoma of lower lobe of left lung (HCC)    T1cN0    Primary osteoarthritis of right knee     Past Surgical History:   Procedure Laterality Date    Appendectomy  1952    Appendectomy      Cholecystectomy  1996    Colonoscopy  2006    Repeat 5 years    Colonoscopy N/A 09/21/2020    Procedure: COLONOSCOPY, POSSIBLE BIOPSY, POSSIBLE POLYPECTOMY 50195;  Surgeon: Cisco Sanders MD;  [place] [facility] [place] [facility]    Colonoscopy,biopsy N/A 09/22/2015    Procedure: COLONOSCOPY, POSSIBLE BIOPSY, POSSIBLE POLYPECTOMY 58526;  Surgeon: Cisco Sanders MD;  Location: [facility]    Ct heart w/ calcium scoring  05/12/2010    [facility]  SCORE 2    Hernia surgery  1958,1976    2X    Knee replacement surgery      ON 2/27/2017: right knee replacement    Lung lobectomy Left 09/15/2023    VATS lower lobectomy by Dr. Matthew    Patient documented not to have experienced any of the following events N/A 09/22/2015    Procedure: COLONOSCOPY, POSSIBLE BIOPSY, POSSIBLE POLYPECTOMY 02525;  Surgeon: Cisco Sanders MD;  Location: [facility]    Patient withough preoperative order for iv antibiotic surgical site infection prophylaxis. N/A 09/22/2015    Procedure: COLONOSCOPY, POSSIBLE BIOPSY, POSSIBLE POLYPECTOMY 45528;  Surgeon: Cisco Sanders MD;  Location: [facility]    Removal gallbladder       Social History     Socioeconomic History    Marital status:    Tobacco Use    Smoking status: Never    Smokeless tobacco: Never   Vaping Use    Vaping status: Never Used   Substance and Sexual Activity    Alcohol use: Not Currently     Comment: 2 WEEKLY    Drug use: No    Sexual activity: Yes     Partners: Male   Social History Narrative    , kids     Social Drivers of Health     Food Insecurity: No Food Insecurity (2/21/2025)    NCSS - Food Insecurity     Worried About Running Out of Food in the Last Year: No     Ran Out of Food in the Last Year: No   Transportation Needs: No Transportation Needs (2/21/2025)    NCSS - Transportation     Lack of Transportation: No   Housing Stability: Not At Risk (2/21/2025)    NCSS - Housing/Utilities     Has Housing: Yes     Worried About Losing Housing: No     Unable to Get Utilities: No     Family History   Problem Relation Age of Onset    Heart Disorder Father         CHF    Diabetes Father     Heart Surgery Father         Triple bypass    Thyroid  Disorder Mother         Goiter    Hypertension Mother     Other (Other) Mother         DEMENTIA    Infectious Disease Brother         AIDS    Cancer Other         M. Aunt- uterine cancer     Reviewed, see above    Medications:    donepezil    memantine    hydrocortisone    fludrocortisone    vancomycin    sodium chloride     Allergies:  Allergies[1]    Physical Exam:  Vitals:    02/21/25 1236   BP: 95/43   Pulse: 84   Resp:    Temp: 97.9 °F (36.6 °C)     Vitals signs and nursing note reviewed.   Constitutional:       Appearance: Normal appearance.   HENT:      Head: Normocephalic and atraumatic.      Mouth: Mucous membranes are moist.   Neck:      Musculoskeletal: Neck supple.   Cardiovascular:      Rate and Rhythm: Normal rate.   Pulmonary:      Effort: Pulmonary effort is normal. No respiratory distress.   Abdominal:      General: Abdomen is flat. There is no distension.      Palpations: Abdomen is soft. There is no mass.      Tenderness: There is no tenderness. There is no guarding or rebound.      Hernia: No hernia is present.   Musculoskeletal:      Right lower leg: No edema.      Left lower leg: No edema.   Skin:     General: Skin is warm and dry.   Neurological:      General: No focal deficit present.      Mental Status: Alert and oriented to person, place, and time.     Laboratory data:  I have independently reviewed all lab results; including old microbiological results.  Lab Results   Component Value Date    WBC 7.5 02/21/2025    HGB 13.7 02/21/2025    HCT 41.9 02/21/2025    .0 02/21/2025    CREATSERUM 1.17 02/21/2025    BUN 24 02/21/2025     02/21/2025    K 3.9 02/21/2025     02/21/2025    CO2 24.0 02/21/2025     02/21/2025    CA 9.1 02/21/2025    ALB 4.9 02/20/2025    ALKPHO 59 02/20/2025    BILT 0.5 02/20/2025    TP 8.5 02/20/2025    AST 33 02/20/2025    ALT 18 02/20/2025    MG 2.0 02/21/2025    TROPHS 3 02/20/2025        Recent Labs   Lab 02/21/25  0451   RBC 4.63   HGB 13.7    HCT 41.9   MCV 90.5   MCH 29.6   MCHC 32.7   RDW 13.1   NEPRELIM 6.68   WBC 7.5   .0       Microbiology data:  No results found for this visit on 02/20/25.    Impression:  Rona Lucas is a 80 year old female with     Gastroenteritis, presumed infectious  Question of C. difficile; however, her testing is consistent with colonization  In addition to that, the patient has not been on any antibiotics to increase the risk of C. difficile  Will need to rule out other causes for gastroenteritis  [place] disease  On stress dose steroids    Recommendations:     Repeat C. difficile testing and check a stool PCR panel  Agree with stress dose steroids  Continue p.o. vancomycin 125 mg 4 times a day in the meantime  Hydration as per primary team  Continue to monitor daily labs for antibiotic toxicity  Further recommendations will depend on the above work-up and clinical progress     The plan of care was discussed with the primary hospital team, Mary Villanueva DO     Recommendations were also discussed with the patient; all questions were answered.     Thank you for this consultation. Please don't hesitate to call the ID team for questions or any acute changes in patient's clinical condition.    Please note that this report has been produced using speech recognition software and may contain errors related to that system including, but not limited to, errors in grammar, punctuation, and spelling, as well as words and phrases that possibly may have been recognized inappropriately.  If there are any questions or concerns, contact the dictating provider for clarification.    The 21st Century Cures Act makes medical notes like these available to patients in the interest of transparency. Please be advised this is a medical document. Medical documents are intended to carry relevant information, facts as evident, and the clinical opinion of the practitioner. The medical note is intended as peer to peer communication  and may appear blunt or direct. It is written in medical language and may contain abbreviations or verbiage that are unfamiliar.     Harsha Cornejo MD  [facility]. Tel: [phone]. Fax: [phone].     Rona Kimmie Lucas : 1945 MRN: [id number] CSN: [id number]        [1] No Known Allergies

## 2025-02-21 NOTE — H&P
[facility] Hospitalist H&P       CC:   Chief Complaint   Patient presents with    Syncope        PCP: Danielle Rider MD    History of Present Illness: Patient is a 80 year old female with PMH sig for addision's disease, HLD hx of SIADH, who presents for evaluation of presyncope episode in the setting of profuse diarrhea. Per , symptoms started yesterday evening after dinner where she felt sudden urge to use the bathroom. She had a few bouts of watery diarrhea and felt lightheaded.  was helping the patient and he noticed that she had \"almost passed out\" hence she was brought to the hospital for evaluation.    In the ER, patient's vitals were stable however later her blood pressure was in 77/37 with elevated heart rate of 107.  Patient received a bolus which helped to improve her blood pressure.  She was admitted for further workup and management.    I evaluated the patient at bedside, she was sitting in a chair, somewhat drowsy but able to participate in exam.   answers questions for her.  She states that she is tired.  She was able to have breakfast and she tolerated that well.    PMH  Past Medical History:    Vilas's disease (HCC)    Baker's cyst of knee, right    Cancer (HCC)    Congenital obstruction of ureteropelvic junction    History of SIADH    Hyperlipidemia    Osteoarthritis    Primary adenocarcinoma of lower lobe of left lung (HCC)    T1cN0    Primary osteoarthritis of right knee        PSH  Past Surgical History:   Procedure Laterality Date    Appendectomy  1952    Appendectomy      Cholecystectomy  1996    Colonoscopy  2006    Repeat 5 years    Colonoscopy N/A 09/21/2020    Procedure: COLONOSCOPY, POSSIBLE BIOPSY, POSSIBLE POLYPECTOMY 86082;  Surgeon: Cisco Sanders MD;  Location: [facility]    Colonoscopy,biopsy N/A 09/22/2015    Procedure: COLONOSCOPY, POSSIBLE BIOPSY, POSSIBLE POLYPECTOMY 81139;  Surgeon: Cisco Sanders MD;  Location: [facility]    Ct  heart w/ calcium scoring  05/12/2010    EDWARD  SCORE 2    Hernia surgery  1958,1976    2X    Knee replacement surgery      ON 2/27/2017: right knee replacement    Lung lobectomy Left 09/15/2023    VATS lower lobectomy by Dr. Matthew    Patient documented not to have experienced any of the following events N/A 09/22/2015    Procedure: COLONOSCOPY, POSSIBLE BIOPSY, POSSIBLE POLYPECTOMY 47736;  Surgeon: Cisco Sanders MD;  Location: [facility]    Patient withough preoperative order for iv antibiotic surgical site infection prophylaxis. N/A 09/22/2015    Procedure: COLONOSCOPY, POSSIBLE BIOPSY, POSSIBLE POLYPECTOMY 79423;  Surgeon: Cisoc Sanders MD;  Location: [facility]    Removal gallbladder          ALL:  Allergies[1]     Home Medications:  Medications Taking[2]      Soc Hx  Social History     Tobacco Use    Smoking status: Never    Smokeless tobacco: Never   Substance Use Topics    Alcohol use: Not Currently     Comment: 2 WEEKLY        Fam Hx  Family History   Problem Relation Age of Onset    Heart Disorder Father         CHF    Diabetes Father     Heart Surgery Father         Triple bypass    Thyroid Disorder Mother         Goiter    Hypertension Mother     Other (Other) Mother         DEMENTIA    Infectious Disease Brother         AIDS    Cancer Other         M. Aunt- uterine cancer       Review of Systems  Comprehensive ROS reviewed and negative except for what's stated above.     OBJECTIVE:  /90 (BP Location: Left arm)   Pulse 88   Temp 99.4 °F (37.4 °C) (Oral)   Resp 18   Ht 5' 8\" (1.727 m)   Wt 171 lb (77.6 kg)   SpO2 95%   BMI 26.00 kg/m²   General:  Alert, no distress, appears tired, chronically ill appearing   Head:  Normocephalic, without obvious abnormality, atraumatic.   Eyes:  Sclera anicteric, No conjunctival pallor, EOMs intact.    Nose: Nares normal. Septum midline. Mucosa normal. No drainage.   Throat: Lips, mucosa, and tongue normal.    Neck: Supple,  symmetrical, trachea midline,   Lungs:   Clear to auscultation bilaterally. Normal effort   Chest wall:  No tenderness or deformity.   Heart:  Regular rate and rhythm, S1, S2 normal,   Abdomen:   Soft, non-tender. Bowel sounds normal.Non distended   Extremities: Extremities normal, atraumatic, no cyanosis or edema.   Skin: Skin color, texture, turgor normal.     Neurologic: Normal strength, no focal deficit appreciated, drowsy, cooperative, participates in exam, gait not assessed,     Diagnostic Data:    CBC/Chem  Recent Labs   Lab 02/20/25 2218 02/21/25  0451   WBC 12.2* 7.5   HGB 16.4* 13.7   MCV 90.5 90.5   .0 193.0       Recent Labs   Lab 02/20/25 2218 02/21/25 0451    143   K 4.2 3.9    109   CO2 22.0 24.0   BUN 21 24*   CREATSERUM 1.30* 1.17*   * 119*   CA 10.1 9.1   MG  --  2.0       Recent Labs   Lab 02/20/25 2218   ALT 18   AST 33   ALB 4.9*       No results for input(s): \"TROP\" in the last 168 hours.    CXR: image personally reviewed     Radiology: No results found.      ASSESSMENT / PLAN:     Patient is a 80 year old female with PMH sig for addision's disease, HLD hx of SIADH, who presents for evaluation of presyncope episode in the setting of profuse diarrhea.    Dehydration  In the setting of diarrhea  - IVF  - Double hydrocortisone dose    Diarrhea  - Stool panel pending  - C. difficile positive but possibly colonization?,  No exposure or antibiotic use  - Patient is immunosuppressed, will start vancomycin p.o. 4 times daily  - ID consulted, appreciate recommendations    Ashland's disease  - Continue fludrocortisone  - Double dose of hydrocortisone, -it is likely that patient is dehydrated (given tachycardia and hemoconcentration) versus sepsis given diarrhea and tachycardia  - if blood pressure continues to be low, will start hydrocortisone 25 mg IV every 6h    History of HIT  - Avoid Lovenox or heparin products  - Previous HARI positive    History of SIADH  - Sodium  currently within normal range  - Monitor BMP in the setting of dehydration    DIANA  - Creatinine slightly elevated in the setting of dehydration  - IVF  - Follow BMP      FN:  - IVF: 0.9 NS  - Diet: Clear liquid diet, advance as tolerated    DVT Prophy: SCDs  Atrophy: PT/OT  Lines: PIV    Dispo: pending clinical course    Outpatient records or previous hospital records reviewed.     Further recommendations pending patient's clinical course.  [facility] hospitalist to continue to follow patient while in house    Patient and/or patient's family given opportunity to ask questions and note understanding and agreeing with therapeutic plan as outlined    Thank You,  DO Aylin Stephens Hospitalist  Answering Service number: [phone]         [1] No Known Allergies  [2]   Outpatient Medications Marked as Taking for the 2/20/25 encounter (Hospital Encounter)   Medication Sig Dispense Refill    memantine 5 MG Oral Tab Take 1 tablet (5 mg total) by mouth 2 (two) times daily.      Calcium Carbonate 600 MG Oral Tab Take 1 tablet (600 mg total) by mouth daily.      cyanocobalamin 500 MCG Oral Tab Take 1 tablet (500 mcg total) by mouth daily.      fludrocortisone 0.1 MG Oral Tab Take 1 tablet (0.1 mg total) by mouth daily. 90 tablet 1    hydrocortisone 10 MG Oral Tab TAKE 1 TABLET TWICE A DAY.  IF SICK, DOUBLE THE DOSE (Patient taking differently: Take 1 tablet (10 mg total) by mouth 2 (two) times daily. TAKE 1 TABLET TWICE A DAY.  IF SICK, DOUBLE THE DOSE) 180 tablet 1    Cholecalciferol (VITAMIN D) 25 MCG (1000 UT) Oral Tab Take 1,000 Units by mouth daily.      Multiple Vitamins-Minerals (MULTIVITAMIN ADULTS 50+ OR) Take 1 tablet by mouth daily.

## 2025-02-21 NOTE — ED INITIAL ASSESSMENT (HPI)
Patient here from home via N[facility] with c/o near syncope and N/V/D.  Patient was in bathroom and had near syncopal episode per EMS.  Patient Aox2 up and walking upon EMS arrival.  Patient covered in stool and vomiting upon arrival.  Patient Aox2-3.

## 2025-02-21 NOTE — PROGRESS NOTES
Orthostatic blood pressures     02/21/25 0154 02/21/25 0157 02/21/25 0200   Vital Signs   /62 127/66 (!) 77/37   MAP (mmHg) 78 77 (!) 41   BP Location Right arm Right arm Right arm   BP Method Automatic Automatic Automatic   Patient Position Lying Sitting Standing

## 2025-02-21 NOTE — ED QUICK NOTES
Orders for admission, patient is aware of plan and ready to go upstairs. Any questions, please call ED RN Boom at extension [phone].     Patient Covid vaccination status: Fully vaccinated     COVID Test Ordered in ED: SARS-CoV-2/Flu A and B/RSV by PCR (GeneXpert)    COVID Suspicion at Admission: N/A    Running Infusions:      Mental Status/LOC at time of transport: x2    Other pertinent information:   CIWA score: N/A   NIH score:  N/A

## 2025-02-21 NOTE — ED PROVIDER NOTES
Patient Seen in: [facility] Emergency Department      History     Chief Complaint   Patient presents with    Syncope     Stated Complaint: syncope    Subjective:   HPI      Patient is an 80-year-old female history of dementia presents to ED for evaluation of vomiting diarrhea, syncopal episode.  Patient with history of dementia.  History obtained mainly by  who I spoke with the bedside.   states she has had 3-4 episodes each of vomiting and diarrhea tonight.  She was sitting on the toilet and had a syncopal episode for a minute or less.  She did not fall off the toilet.  Patient denies chest pain, abdominal pain or shortness of breath.   witnessed syncopal episode and patient was brought in by EMS.  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    Objective:     No pertinent past medical history.            No pertinent past surgical history.              No pertinent social history.                Physical Exam     ED Triage Vitals [02/20/25 2206]   BP (!) 156/109   Pulse 84   Resp 20   Temp 98.3 °F (36.8 °C)   Temp src Oral   SpO2 100 %   O2 Device None (Room air)       Current Vitals:   Vital Signs  BP: 131/64  Pulse: 102  Resp: 20  Temp: 98.3 °F (36.8 °C)  Temp src: Oral  MAP (mmHg): 82    Oxygen Therapy  SpO2: 95 %  O2 Device: None (Room air)        Physical Exam  GENERAL: No acute distress, well appearing and non-toxic, Alert and oriented X 3   HEENT: Normocephalic, atraumatic.  Dry mucous membranes.  Pupils equal round reactive to light and accommodation, extraocular motion is intact, sclerae white, conjunctiva is pink.  Oropharynx is unremarkable, no exudate.  NECK: Supple, trachea midline, no lymphadenopathy.   LUNG: Lungs clear to auscultation bilaterally, no wheezing, no rales, no rhonchi.  CARDIOVASCULAR: Regular rate and rhythm.  Normal S1S2.  No S3S4 or murmur.  ABDOMEN: Bowel sounds are present. Soft. nondistended, no pulsatile masses.  nontender  MUSCULOSKELETAL: No calf tenderness.  Dorsalis and Posterior Tibial pulses present. No clubbing. No cyanosis.  No edema.   SKIN EXAMINATIoN: Warm and dry with normal appearance.  No rashes or lesions.  NEUROLOGICAL:  Motor strength intact all groups.  normal sensation, speech intact    ED Course     Labs Reviewed   CBC WITH DIFFERENTIAL WITH PLATELET - Abnormal; Notable for the following components:       Result Value    WBC 12.2 (*)     RBC 5.47 (*)     HGB 16.4 (*)     HCT 49.5 (*)     Neutrophil Absolute Prelim 10.95 (*)     Neutrophil Absolute 10.95 (*)     Lymphocyte Absolute 0.86 (*)     All other components within normal limits   COMP METABOLIC PANEL (14) - Abnormal; Notable for the following components:    Glucose 109 (*)     Creatinine 1.30 (*)     eGFR-Cr 41 (*)     Total Protein 8.5 (*)     Albumin 4.9 (*)     Globulin  3.6 (*)     All other components within normal limits   TROPONIN I HIGH SENSITIVITY - Normal   SARS-COV-2/FLU A AND B/RSV BY PCR (GENEXPERT) - Normal    Narrative:     This test is intended for the qualitative detection and differentiation of SARS-CoV-2, influenza A, influenza B, and respiratory syncytial virus (RSV) viral RNA in nasopharyngeal or nares swabs from individuals suspected of respiratory viral infection consistent with COVID-19 by their healthcare provider. Signs and symptoms of respiratory viral infection due to SARS-CoV-2, influenza, and RSV can be similar.    Test performed using the Xpert Xpress SARS-CoV-2/FLU/RSV (real time RT-PCR)  assay on the GeneXpert instrument, [employer], [place] [address].   This test is being used under the Food and Drug Administration's Emergency Use Authorization.    The authorized Fact Sheet for Healthcare Providers for this assay is available upon request from the laboratory.   RAINBOW DRAW LAVENDER   RAINBOW DRAW LIGHT GREEN   White blood cell count 12.2.  Hemoglobin 16.4.  Creatinine 1.3  EKG    Rate, intervals and axes as noted  on EKG Report.  Rate: 86  Rhythm: Sinus Rhythm  Reading: No acute changes                Medications   sodium chloride 0.9 % IV bolus 1,000 mL (1,000 mL Intravenous New Bag 2/20/25 2242)   ondansetron (Zofran) 4 MG/2ML injection 4 mg (4 mg Intravenous Given 2/20/25 2243)            MDM      Patient is an 80-year-old female presents to ED for evaluation of vomiting, diarrhea, syncope.  Witnessed syncopal episode per  on the toilet.  Vomiting and diarrhea today.  Differential gastroenteritis, dehydration, electrolyte normality, cardiac arrhythmia, vasovagal episode.  Laboratory test obtained showing leukocytosis with elevated creatinine of 1.3 from baseline consistent with mild dehydration.  EKG normal.  Patient given IV fluids.  Will admit to hospital for observation given syncopal episode.  Symptoms consistent with gastroenteritis and dehydration.  Case discussed with hospitalist.  Workup and results were discussed with patient. Patient has no other questions, complaints or concerns. Patient will be admitted to the hospital for further workup.        Medical Decision Making      Disposition and Plan     Clinical Impression:  1. Syncope and collapse    2. Gastroenteritis         Disposition:  There is no disposition on file for this visit.  There is no disposition time on file for this visit.    Follow-up:  No follow-up provider specified.        Medications Prescribed:  Current Discharge Medication List              Supplementary Documentation:

## 2025-02-22 LAB
ADENOVIRUS F 40/41 PCR: NEGATIVE
ALBUMIN SERPL-MCNC: 3.8 G/DL (ref 3.2–4.8)
ALBUMIN/GLOB SERPL: 1.8 {RATIO} (ref 1–2)
ALP LIVER SERPL-CCNC: 41 U/L
ALT SERPL-CCNC: 18 U/L
ANION GAP SERPL CALC-SCNC: 10 MMOL/L (ref 0–18)
AST SERPL-CCNC: 32 U/L (ref ?–34)
ASTROVIRUS PCR: NEGATIVE
BASOPHILS # BLD AUTO: 0 X10(3) UL (ref 0–0.2)
BASOPHILS NFR BLD AUTO: 0 %
BILIRUB SERPL-MCNC: 0.5 MG/DL (ref 0.2–1.1)
BUN BLD-MCNC: 17 MG/DL (ref 9–23)
C CAYETANENSIS DNA SPEC QL NAA+PROBE: NEGATIVE
CALCIUM BLD-MCNC: 8.3 MG/DL (ref 8.7–10.6)
CAMPY SP DNA.DIARRHEA STL QL NAA+PROBE: NEGATIVE
CHLORIDE SERPL-SCNC: 107 MMOL/L (ref 98–112)
CO2 SERPL-SCNC: 23 MMOL/L (ref 21–32)
CREAT BLD-MCNC: 0.96 MG/DL
CRYPTOSP DNA SPEC QL NAA+PROBE: NEGATIVE
EAEC PAA PLAS AGGR+AATA ST NAA+NON-PRB: NEGATIVE
EC STX1+STX2 + H7 FLIC SPEC NAA+PROBE: NEGATIVE
EGFRCR SERPLBLD CKD-EPI 2021: 59 ML/MIN/1.73M2 (ref 60–?)
ENTAMOEBA HISTOLYTICA PCR: NEGATIVE
EOSINOPHIL # BLD AUTO: 0.01 X10(3) UL (ref 0–0.7)
EOSINOPHIL NFR BLD AUTO: 0.2 %
EPEC EAE GENE STL QL NAA+NON-PROBE: NEGATIVE
ERYTHROCYTE [DISTWIDTH] IN BLOOD BY AUTOMATED COUNT: 13.3 %
ETEC LTA+ST1A+ST1B TOX ST NAA+NON-PROBE: NEGATIVE
GIARDIA LAMBLIA PCR: NEGATIVE
GLOBULIN PLAS-MCNC: 2.1 G/DL (ref 2–3.5)
GLUCOSE BLD-MCNC: 85 MG/DL (ref 70–99)
HCT VFR BLD AUTO: 38.4 %
HGB BLD-MCNC: 12.7 G/DL
IMM GRANULOCYTES # BLD AUTO: 0.01 X10(3) UL (ref 0–1)
IMM GRANULOCYTES NFR BLD: 0.2 %
LYMPHOCYTES # BLD AUTO: 0.73 X10(3) UL (ref 1–4)
LYMPHOCYTES NFR BLD AUTO: 13.2 %
MCH RBC QN AUTO: 30 PG (ref 26–34)
MCHC RBC AUTO-ENTMCNC: 33.1 G/DL (ref 31–37)
MCV RBC AUTO: 90.6 FL
MONOCYTES # BLD AUTO: 0.4 X10(3) UL (ref 0.1–1)
MONOCYTES NFR BLD AUTO: 7.3 %
NEUTROPHILS # BLD AUTO: 4.36 X10 (3) UL (ref 1.5–7.7)
NEUTROPHILS # BLD AUTO: 4.36 X10(3) UL (ref 1.5–7.7)
NEUTROPHILS NFR BLD AUTO: 79.1 %
NOROVIRUS GI/GII PCR: POSITIVE
OSMOLALITY SERPL CALC.SUM OF ELEC: 291 MOSM/KG (ref 275–295)
P SHIGELLOIDES DNA STL QL NAA+PROBE: NEGATIVE
PLATELET # BLD AUTO: 174 10(3)UL (ref 150–450)
POTASSIUM SERPL-SCNC: 3.5 MMOL/L (ref 3.5–5.1)
PROT SERPL-MCNC: 5.9 G/DL (ref 5.7–8.2)
RBC # BLD AUTO: 4.24 X10(6)UL
ROTAVIRUS A PCR: NEGATIVE
SALMONELLA DNA SPEC QL NAA+PROBE: NEGATIVE
SAPOVIRUS PCR: NEGATIVE
SHIGELLA SP+EIEC IPAH ST NAA+NON-PROBE: NEGATIVE
SODIUM SERPL-SCNC: 140 MMOL/L (ref 136–145)
V CHOLERAE DNA SPEC QL NAA+PROBE: NEGATIVE
VIBRIO DNA SPEC NAA+PROBE: NEGATIVE
WBC # BLD AUTO: 5.5 X10(3) UL (ref 4–11)
YERSINIA DNA SPEC NAA+PROBE: NEGATIVE

## 2025-02-22 PROCEDURE — 80053 COMPREHEN METABOLIC PANEL: CPT | Performed by: STUDENT IN AN ORGANIZED HEALTH CARE EDUCATION/TRAINING PROGRAM

## 2025-02-22 PROCEDURE — 85025 COMPLETE CBC W/AUTO DIFF WBC: CPT | Performed by: STUDENT IN AN ORGANIZED HEALTH CARE EDUCATION/TRAINING PROGRAM

## 2025-02-22 RX ORDER — TAMSULOSIN HYDROCHLORIDE 0.4 MG/1
0.4 CAPSULE ORAL DAILY
Status: DISCONTINUED | OUTPATIENT
Start: 2025-02-22 | End: 2025-02-23

## 2025-02-22 NOTE — PLAN OF CARE
AxO1 - hx dementia. On RA - softer bps - on 0.9 @ 100. Denies nausea, tolerating clear liquid diet, having multiple loose, watery BM. Monitoring voiding via straight cath protocol. Denies pain. C.diff pos - on PO vanco. Up sb. Video monitoring in place. Updated on POC. Safety measures placed. Care ongoing.

## 2025-02-22 NOTE — PHYSICAL THERAPY NOTE
Duplicate PT order received. Chart reviewed. Pt seen for initial evaluation on 2/21 and was up SBA with RW. RN confirms no change in functional mobility status at this time. Will sign off on order. RN aware and in agreement.

## 2025-02-22 NOTE — PROGRESS NOTES
Infectious Disease Progress Note      Date of admission: 2/20/2025 10:00 PM     Reason for consult: Gastroenteritis, question of C. difficile    Referring physician: Mary Villanueva DO    Subjective: Continues to have diarrhea, described as watery stools.  No abdominal pain.  No nausea or vomiting.    The rest of the systems were reviewed and found to be negative except was mentioned above    Interval events: This is an 80-year-old female patient with history of Kalida's disease, dementia, hyperlipidemia, SIADH, presenting here with sudden onset nausea vomiting along with severe diarrhea.  Stool C. difficile testing consistent with colonization.  Stool PCR panel currently pending.  Currently on p.o. vancomycin.      Medications:    donepezil    memantine    fludrocortisone    vancomycin    sodium chloride    hydrocortisone     Allergies:  Allergies[1]    Physical Exam:  Vitals:    02/22/25 0752   BP: 102/50   Pulse: 66   Resp: 18   Temp: 98.2 °F (36.8 °C)     Vitals signs and nursing note reviewed.   Constitutional:       Appearance: Normal appearance.   HENT:      Head: Normocephalic and atraumatic.      Mouth: Mucous membranes are moist.   Neck:      Musculoskeletal: Neck supple.   Cardiovascular:      Rate and Rhythm: Normal rate.   Pulmonary:      Effort: Pulmonary effort is normal. No respiratory distress.   Abdominal:      General: Abdomen is flat. There is no distension.      Palpations: Abdomen is soft. There is no mass.      Tenderness: There is no tenderness. There is no guarding or rebound.      Hernia: No hernia is present.   Musculoskeletal:      Right lower leg: No edema.      Left lower leg: No edema.   Skin:     General: Skin is warm and dry.       Laboratory data:  I have reviewed all the lab results independently.  Lab Results   Component Value Date    WBC 5.5 02/22/2025    HGB 12.7 02/22/2025    HCT 38.4 02/22/2025    .0 02/22/2025    CREATSERUM 0.96 02/22/2025    BUN 17 02/22/2025      02/22/2025    K 3.5 02/22/2025     02/22/2025    CO2 23.0 02/22/2025    GLU 85 02/22/2025    CA 8.3 02/22/2025    ALB 3.8 02/22/2025    ALKPHO 41 02/22/2025    BILT 0.5 02/22/2025    TP 5.9 02/22/2025    AST 32 02/22/2025    ALT 18 02/22/2025      Recent Labs   Lab 02/22/25  0500   RBC 4.24   HGB 12.7   HCT 38.4   MCV 90.6   MCH 30.0   MCHC 33.1   RDW 13.3   NEPRELIM 4.36   WBC 5.5   .0      Microbiology data:  No results found for this visit on 02/20/25.     Impression:  Rona Lucas is a 80 year old female with    Gastroenteritis presumed infectious  C. difficile testing x 2 consistent with colonization  Stool PCR panel pending  Suspect this is a viral illness  Currently on p.o. vancomycin  Benji disease  On stress dose steroids    Recommendations:    Follow-up on stool PCR panel  Management of stress dose steroids as per primary team  Continue p.o. vancomycin 125 mg 4 times a day in the meantime  Continue to monitor daily labs for antibiotic toxicities  Further recommendations will depend on the above work-up and clinical progress     The plan of care was discussed with the primary hospital team, Mary Villanueva DO     Recommendations were also discussed with the patient; all questions were answered.     Thank you for this consultation. Please don't hesitate to call the ID team for questions or any acute changes in patient's clinical condition.    Please note that this report has been produced using speech recognition software and may contain errors related to that system including, but not limited to, errors in grammar, punctuation, and spelling, as well as words and phrases that possibly may have been recognized inappropriately.  If there are any questions or concerns, contact the dictating provider for clarification.    The 21st Century Cures Act makes medical notes like these available to patients in the interest of transparency. Please be advised this is a medical document.  Medical documents are intended to carry relevant information, facts as evident, and the clinical opinion of the practitioner. The medical note is intended as peer to peer communication and may appear blunt or direct. It is written in medical language and may contain abbreviations or verbiage that are unfamiliar.     Harsha Cornejo MD  [facility]. Tel: [phone]. Fax: [phone].     Rona Lucas : 1945 MRN: [id number] CSN: [id number]          [1] No Known Allergies

## 2025-02-22 NOTE — PROGRESS NOTES
[facility] Hospitalist Progress Note     CC: Hospital Follow up    PCP: Danielle Rider MD       Assessment/Plan:     Principal Problem:    Syncope and collapse  Active Problems:    Gastroenteritis          Patient is a 80 year old female with PMH sig for addision's disease, HLD hx of SIADH, who presents for evaluation of presyncope episode in the setting of profuse diarrhea.     Dehydration  In the setting of diarrhea  - IVF  - Double hydrocortisone dose     Diarrhea  - Stool panel with norovirus  - C. difficile positive but possibly colonization?,  No exposure or antibiotic use  - stop oral vanco given positive for Norovirus, discussed with ID  - ID consulted, appreciate recommendations     [place]'s disease  - Continue fludrocortisone  - Double dose of hydrocortisone, -it is likely that patient is dehydrated (given tachycardia and hemoconcentration) versus sepsis- given diarrhea and tachycardia  - if blood pressure continues to be low, will start hydrocortisone 25 mg IV every 6h     History of HIT  - Avoid Lovenox or heparin products  - Previous HARI positive     History of SIADH  - Sodium currently within normal range  - Monitor BMP in the setting of dehydration     DIANA- resolved  - Creatinine slightly elevated in the setting of dehydration  - IVF  - Follow BMP        FN:  - IVF: 0.9 NS, reduce rate  - Diet:  advance as tolerated     DVT Prophy: SCDs  Atrophy: PT/OT  Lines: PIV     Dispo: pending clinical course     Outpatient records or previous hospital records reviewed.      Further recommendations pending patient's clinical course.  [facility] hospitalist to continue to follow patient while in house     Patient and/or patient's family given opportunity to ask questions and note understanding and agreeing with therapeutic plan as outlined     Thank You,  DO Aylin Stephens Hospitalist  Answering Service number: [phone]     Subjective:     No CP, SOB, or N/V. Resting in bed. No overnight issues. Still  with some loose stools. But feeling better.     OBJECTIVE:    Blood pressure 141/70, pulse 74, temperature 98.1 °F (36.7 °C), temperature source Oral, resp. rate 18, height 5' 8\" (1.727 m), weight 171 lb (77.6 kg), SpO2 96%, not currently breastfeeding.    Temp:  [98.1 °F (36.7 °C)-99.6 °F (37.6 °C)] 98.1 °F (36.7 °C)  Pulse:  [66-83] 74  Resp:  [18-22] 18  BP: ()/(35-70) 141/70  SpO2:  [92 %-98 %] 96 %      Intake/Output:    Intake/Output Summary (Last 24 hours) at 2/22/2025 1518  Last data filed at 2/22/2025 1200  Gross per 24 hour   Intake 240 ml   Output 2100 ml   Net -1860 ml       Last 3 Weights   02/21/25 0440 171 lb (77.6 kg)   02/20/25 2206 190 lb (86.2 kg)   09/27/23 1206 172 lb 3.2 oz (78.1 kg)   09/17/23 0400 174 lb 9.7 oz (79.2 kg)   09/16/23 0400 177 lb 11.1 oz (80.6 kg)   09/15/23 1659 171 lb 4.8 oz (77.7 kg)   09/15/23 1057 171 lb 6 oz (77.7 kg)   09/12/23 1426 175 lb (79.4 kg)       Exam   Gen:  alert and oriented x3, no focal neurologic deficits, elderly female in no distress, pale  Heent: NC AT, mucous memb clear, neck supple  Pulm: Lungs clear, normal respiratory effort  CV: Heart with regular rate and rhythm, no peripheral edema  Abd: Abdomen soft, nontender, nondistended, bowel sounds present  MSK: Full range of motion in extremities, no cyanosis  Neuro: AO*3, motor intact, no sensory deficits  Psyc: appropriate mood and affect      Data Review:       Labs:     Recent Labs   Lab 02/20/25  2218 02/21/25  0451 02/22/25  0500   RBC 5.47* 4.63 4.24   HGB 16.4* 13.7 12.7   HCT 49.5* 41.9 38.4   MCV 90.5 90.5 90.6   MCH 30.0 29.6 30.0   MCHC 33.1 32.7 33.1   RDW 13.0 13.1 13.3   NEPRELIM 10.95* 6.68 4.36   WBC 12.2* 7.5 5.5   .0 193.0 174.0         Recent Labs   Lab 02/20/25  2218 02/21/25  0451 02/22/25  0500   * 119* 85   BUN 21 24* 17   CREATSERUM 1.30* 1.17* 0.96   EGFRCR 41* 47* 59*   CA 10.1 9.1 8.3*    143 140   K 4.2 3.9 3.5    109 107   CO2 22.0 24.0 23.0        Recent Labs   Lab 02/20/25  2218 02/22/25  0500   ALT 18 18   AST 33 32   ALB 4.9* 3.8         Imaging:  No results found.      Meds:      donepezil  10 mg Oral Nightly    memantine  5 mg Oral BID    fludrocortisone  0.1 mg Oral Daily    hydrocortisone  20 mg Oral BID      sodium chloride 100 mL/hr at 02/22/25 1116           Supplementary Documentation:   DVT Mechanical Prophylaxis:        DVT Pharmacologic Prophylaxis   Medication   None                Code Status: Full Code  Gonzales: No urinary catheter in place  Gonzales Duration (in days):   Central line:    JUAN LUIS:

## 2025-02-22 NOTE — PLAN OF CARE
Patient is alert and oriented x 2. Vitals stable on room air. Denies pain & nausea. Tolerating clears, advanced to full liquid. Up SBA. Plan for PO abx & IVF. Plan of care discussed with patient.

## 2025-02-23 VITALS
RESPIRATION RATE: 18 BRPM | BODY MASS INDEX: 25.91 KG/M2 | HEART RATE: 64 BPM | TEMPERATURE: 98 F | HEIGHT: 68 IN | DIASTOLIC BLOOD PRESSURE: 52 MMHG | SYSTOLIC BLOOD PRESSURE: 130 MMHG | OXYGEN SATURATION: 95 % | WEIGHT: 171 LBS

## 2025-02-23 LAB
ANION GAP SERPL CALC-SCNC: 11 MMOL/L (ref 0–18)
BASOPHILS # BLD AUTO: 0.01 X10(3) UL (ref 0–0.2)
BASOPHILS NFR BLD AUTO: 0.2 %
BUN BLD-MCNC: 12 MG/DL (ref 9–23)
CALCIUM BLD-MCNC: 8.5 MG/DL (ref 8.7–10.6)
CHLORIDE SERPL-SCNC: 110 MMOL/L (ref 98–112)
CO2 SERPL-SCNC: 21 MMOL/L (ref 21–32)
CREAT BLD-MCNC: 0.86 MG/DL
EGFRCR SERPLBLD CKD-EPI 2021: 67 ML/MIN/1.73M2 (ref 60–?)
EOSINOPHIL # BLD AUTO: 0 X10(3) UL (ref 0–0.7)
EOSINOPHIL NFR BLD AUTO: 0 %
ERYTHROCYTE [DISTWIDTH] IN BLOOD BY AUTOMATED COUNT: 13.2 %
GLUCOSE BLD-MCNC: 93 MG/DL (ref 70–99)
HCT VFR BLD AUTO: 38.7 %
HGB BLD-MCNC: 13.1 G/DL
IMM GRANULOCYTES # BLD AUTO: 0.03 X10(3) UL (ref 0–1)
IMM GRANULOCYTES NFR BLD: 0.5 %
LYMPHOCYTES # BLD AUTO: 1.26 X10(3) UL (ref 1–4)
LYMPHOCYTES NFR BLD AUTO: 19.9 %
MCH RBC QN AUTO: 29.4 PG (ref 26–34)
MCHC RBC AUTO-ENTMCNC: 33.9 G/DL (ref 31–37)
MCV RBC AUTO: 87 FL
MONOCYTES # BLD AUTO: 0.61 X10(3) UL (ref 0.1–1)
MONOCYTES NFR BLD AUTO: 9.7 %
NEUTROPHILS # BLD AUTO: 4.41 X10 (3) UL (ref 1.5–7.7)
NEUTROPHILS # BLD AUTO: 4.41 X10(3) UL (ref 1.5–7.7)
NEUTROPHILS NFR BLD AUTO: 69.7 %
OSMOLALITY SERPL CALC.SUM OF ELEC: 293 MOSM/KG (ref 275–295)
PLATELET # BLD AUTO: 168 10(3)UL (ref 150–450)
POTASSIUM SERPL-SCNC: 3.2 MMOL/L (ref 3.5–5.1)
RBC # BLD AUTO: 4.45 X10(6)UL
SODIUM SERPL-SCNC: 142 MMOL/L (ref 136–145)
WBC # BLD AUTO: 6.3 X10(3) UL (ref 4–11)

## 2025-02-23 PROCEDURE — 85025 COMPLETE CBC W/AUTO DIFF WBC: CPT | Performed by: STUDENT IN AN ORGANIZED HEALTH CARE EDUCATION/TRAINING PROGRAM

## 2025-02-23 PROCEDURE — 94760 N-INVAS EAR/PLS OXIMETRY 1: CPT

## 2025-02-23 PROCEDURE — 80048 BASIC METABOLIC PNL TOTAL CA: CPT | Performed by: STUDENT IN AN ORGANIZED HEALTH CARE EDUCATION/TRAINING PROGRAM

## 2025-02-23 RX ORDER — POTASSIUM CHLORIDE 1500 MG/1
40 TABLET, EXTENDED RELEASE ORAL ONCE
Status: COMPLETED | OUTPATIENT
Start: 2025-02-23 | End: 2025-02-23

## 2025-02-23 NOTE — DISCHARGE INSTRUCTIONS
Recommend hydrocortisone 20 mg twice daily for 1 more day, then reduce the dose to your regular regimen of hydrocortisone 10 mg twice daily.     Stay hydrated    Understanding Norovirus  Norovirus is a very contagious virus that can infect the stomach and intestines. It causes diarrhea and vomiting. Norovirus is the most common cause of illness from contaminated food in the [place] The virus spreads easily through contaminated foods and surfaces. Norovirus is not related to the influenza (flu) virus.   What causes norovirus infection?  You can be infected with norovirus by coming into contact with a person who has the virus. Or you can get it by touching a contaminated surface or eating contaminated food. Washing your hands well can lower your risk of getting the virus. You can also get it by consuming food and water contaminated with the virus. Foods most likely to become tainted include:   Shellfish  Ready-to-eat salads and sandwiches  Produce, such as celery, melons, and leafy vegetables  Follow these tips to prevent infection:   Wash your hands for at least 20 seconds with soap and water after going to the bathroom, after changing a diaper, and before touching any food. Don't prepare food for others when sick and for 2 days after symptoms stop.  If someone in the house has norovirus, disinfect all surfaces that might be contaminated. Use a bleach-based . Wash clothes or linens right away that may be contaminated.  Don't eat food or drink water in areas with warnings of contamination.  What are the symptoms of norovirus?   Some people may have no symptoms. In people who do, symptoms show up suddenly, often within a day of being exposed. The illness lasts 1 to 3 days. Symptoms include:   Fever  Nonbloody diarrhea  Nausea  Nonbloody vomiting  Headache  Achiness  Stomach cramping  More severe cases are often seen in infants, older adults, and people with other health problems. Symptoms may last longer and be  more severe in these groups.   How is norovirus treated?   There is no medicine to cure norovirus, but some may be used to ease symptoms. Treatment includes:   Rest. You may feel better faster if you get plenty of rest.  Fluids. Drinking lots of fluids will help you stay hydrated. Don’t drink alcohol or beverages with caffeine. They can make your symptoms worse.  Medicine. Over-the-counter medicines for diarrhea may ease symptoms. These should be used only by adults. Pain relievers, such as acetaminophen or ibuprofen, can help with headaches and body aches. Antibiotics are not effective in treating norovirus.  What are the possible complications of norovirus?  Dehydration is the main concern with norovirus infection. Severe dehydration may need to be treated in the hospital. You may need to get fluids through an IV (intravenous line) in your vein.   When should I call my healthcare provider?   Call your healthcare provider right away if any of the following occur:   Fever of 100.4°F (38°C) or higher, or as directed by your healthcare provider  Belly (abdominal) pain that gets worse  Severe dizziness, especially when getting up from bed  Vomiting so severe that you can’t keep fluids down

## 2025-02-23 NOTE — PROGRESS NOTES
Discharge AVS reviewed with patient. All questions answered. IV removed. Bedside belongings packed up and returned to patient.

## 2025-02-23 NOTE — PLAN OF CARE
Alert and Oriented x2. On RA. VSS. Denies pain at this time. Denies N/T. Voiding freely. Tolerating diet. Denies N/V. Call light within reach at this time.    Plan: Soft diet - advance as tolerated, monitor stools    2905: Paged hospitalist for patient increased agitation and confusion, order for sitter

## 2025-02-23 NOTE — PLAN OF CARE
Patient is alert and oriented x 2. Vitals stable on room air. Denies pain & nausea. Voiding without difficulty. Bladder scan 364mL. Tolerating soft diet. Up SBA. Stools decreasing. Plan to discharge home today. Plan of care discussed with patient.

## 2025-04-16 NOTE — PROGRESS NOTES
Infectious Disease Progress Note      Date of admission: 2/20/2025 10:00 PM     Reason for consult: Gastroenteritis, question of C. difficile    Referring physician: Mary Villanueva DO    Subjective: Continues to have diarrhea, described as watery stools.  No abdominal pain.  No nausea or vomiting.    The rest of the systems were reviewed and found to be negative except was mentioned above    Interval events: This is an 80-year-old female patient with history of Arlington's disease, dementia, hyperlipidemia, SIADH, presenting here with sudden onset nausea vomiting along with severe diarrhea.  Stool C. difficile testing consistent with colonization.  Stool PCR panel positive for norovirus.  P.o. vancomycin discontinued on 2/22/2025      Medications:    tamsulosin    donepezil    memantine    fludrocortisone    sodium chloride    hydrocortisone     Allergies:  Allergies[1]    Physical Exam:  Vitals:    02/23/25 0900   BP: 130/52   Pulse: 64   Resp: 18   Temp: 98.4 °F (36.9 °C)     Vitals signs and nursing note reviewed.   Constitutional:       Appearance: Normal appearance.   HENT:      Head: Normocephalic and atraumatic.      Mouth: Mucous membranes are moist.   Neck:      Musculoskeletal: Neck supple.   Cardiovascular:      Rate and Rhythm: Normal rate.   Pulmonary:      Effort: Pulmonary effort is normal. No respiratory distress.   Abdominal:      General: Abdomen is flat. There is no distension.      Palpations: Abdomen is soft. There is no mass.      Tenderness: There is no tenderness. There is no guarding or rebound.      Hernia: No hernia is present.   Musculoskeletal:      Right lower leg: No edema.      Left lower leg: No edema.   Skin:     General: Skin is warm and dry.       Laboratory data:  I have reviewed all the lab results independently.  Lab Results   Component Value Date    WBC 6.3 02/23/2025    HGB 13.1 02/23/2025    HCT 38.7 02/23/2025    .0 02/23/2025    CREATSERUM 0.86 02/23/2025    BUN  OCCUPATIONAL THERAPY EVALUATION - INPATIENT     Room Number: 209/209-A  Evaluation Date: 4/16/2025  Type of Evaluation: Initial  Presenting Problem: R VAHID; posterior approach; WBAT; posterior precautions    Physician Order: IP Consult to Occupational Therapy  Reason for Therapy: ADL/IADL Dysfunction and Discharge Planning    OCCUPATIONAL THERAPY ASSESSMENT   Patient is a 80 year old female admitted 4/15/2025 for R VAHID; posterior approach; WBAT; pt t/f to CCU for closer monitoring 2/ 2 hypotension;.  Prior to admission, patient's baseline is independent; lives home alone with significant other (with mild dementia) staying with her; reports \"he will be a great caregiver\"; pt has good social support.  Patient is currently functioning below baseline with self care; .  Patient is requiring up to Mod A with ADLS; CGA-Min A for fxl mobility and transfers as a result of the following impairments: activity tolerance, balance, mobility, medical status, hip precautions. Occupational Therapy will continue to follow for duration of hospitalization.    Patient will benefit from continued skilled OT Services with no additional skilled services but increased support at home. Patient will continue with HH PT    PLAN DURING HOSPITALIZATION  OT Device Recommendations: TBD  OT Treatment Plan: Balance activities, ADL training, Energy conservation/work simplification techniques, Functional transfer training, Endurance training, Patient/Family education, Patient/Family training, Compensatory technique education     OCCUPATIONAL THERAPY MEDICAL/SOCIAL HISTORY   Problem List   Principal Problem:    Primary osteoarthritis of right hip  Active Problems:    Hyperlipidemia    S/P hip replacement, right    HOME SITUATION  Type of Home: House  Home Layout: One level  Lives With: Significant other  Toilet and Equipment: Comfort height toilet  Shower/Tub and Equipment: Walk-in shower  Drives: Yes  Patient Regularly Uses: None    SUBJECTIVE  Pt  12 02/23/2025     02/23/2025    K 3.2 02/23/2025     02/23/2025    CO2 21.0 02/23/2025    GLU 93 02/23/2025    CA 8.5 02/23/2025      Recent Labs   Lab 02/23/25  0520   RBC 4.45   HGB 13.1   HCT 38.7   MCV 87.0   MCH 29.4   MCHC 33.9   RDW 13.2   NEPRELIM 4.41   WBC 6.3   .0      Microbiology data:  No results found for this visit on 02/20/25.     Impression:  Rona Lucas is a 80 year old female with    Gastroenteritis presumed infectious  C. difficile testing x 2 consistent with colonization  Stool PCR panel is positive for norovirus  Vancomycin discontinued on 2/22/2025  Benji disease  On stress dose steroids    Recommendations:    Supportive care as per the primary team  No indication for antimicrobial therapy  ID will sign off, please call us with any questions or changes status.  Thank you for this consultation.    The plan of care was discussed with the primary hospital team, Mary Villanueva DO     Recommendations were also discussed with the patient's  who was in the room; all questions were answered.     Thank you for this consultation. Please don't hesitate to call the ID team for questions or any acute changes in patient's clinical condition.    Please note that this report has been produced using speech recognition software and may contain errors related to that system including, but not limited to, errors in grammar, punctuation, and spelling, as well as words and phrases that possibly may have been recognized inappropriately.  If there are any questions or concerns, contact the dictating provider for clarification.    The 21st Century Cures Act makes medical notes like these available to patients in the interest of transparency. Please be advised this is a medical document. Medical documents are intended to carry relevant information, facts as evident, and the clinical opinion of the practitioner. The medical note is intended as peer to peer communication and may  reports no dizziness; very motivated for OOB activities     OCCUPATIONAL THERAPY EXAMINATION   OBJECTIVE  Precautions: Bed/chair alarm  Fall Risk: Standard fall risk    WEIGHT BEARING RESTRICTION     PAIN ASSESSMENT  Rating: 3  Location: hip  Management Techniques: Activity promotion    ACTIVITY TOLERANCE  Pulse: 60        BP: 106/63  BP Location: Right arm  BP Method: Automatic  Patient Position: Sitting    O2 SATURATIONS       COGNITION  WNL    RANGE OF MOTION   Upper extremity ROM is within functional limits     STRENGTH ASSESSMENT  Upper extremity strength is within functional limits     ACTIVITIES OF DAILY LIVING ASSESSMENT  AM-PAC ‘6-Clicks’ Inpatient Daily Activity Short Form  How much help from another person does the patient currently need…  -   Putting on and taking off regular lower body clothing?: A Lot  -   Bathing (including washing, rinsing, drying)?: A Little  -   Toileting, which includes using toilet, bedpan or urinal? : A Little  -   Putting on and taking off regular upper body clothing?: None  -   Taking care of personal grooming such as brushing teeth?: None  -   Eating meals?: None    AM-PAC Score:  Score: 20  Approx Degree of Impairment: 38.32%  Standardized Score (AM-PAC Scale): 42.03  CMS Modifier (G-Code): CJ    FUNCTIONAL TRANSFER ASSESSMENT  Sit to Stand: Edge of Bed  Edge of Bed: Contact Guard Assist  Toilet Transfer: Contact Guard Assist    BED MOBILITY  Rolling: Minimal Assist  Supine to Sit : Minimal Assist  Scooting: Min A    BALANCE ASSESSMENT  Static Sitting: Stand-by Assist  Static Standing: Contact Guard Assist    FUNCTIONAL ADL ASSESSMENT  Eating: Independent  Grooming Seated: Independent  Bathing Seated: Minimal Assist  UB Dressing Seated: Stand-by Assist  LB Dressing Seated: Moderate Assist  Toileting Seated: Contact Guard Assist    THERAPEUTIC EXERCISE     Skilled Therapy Provided: Pt care coordinated with PT; pt rcvd in bed; alert, oriented; able to follow all commands;  appear blunt or direct. It is written in medical language and may contain abbreviations or verbiage that are unfamiliar.     Harsha Cornejo MD  [facility]. Tel: [phone]. Fax: [phone].     Rona Smiththa Lance : 1945 MRN: [id number] CSN: [id number]          [1] No Known Allergies     provided education on hip precautions in relation to ADLs and home setup; will benefit from ongoing reinforcement; discussed LHAE options, however, pt does reports her S.O will be able to assist at d/c; advised will demo at next session just in case; bed mobility completed with Min A; tolerating sitting at EOB with SBA >5 minutes; stood to RW with CGA; ambulatory >household distances iwht CGA with RW; fucntional t/f with CGA overall; VSS thorughout this session and responded well to activities; pt left up in chair with alarm set and all needs in reach. Continue skilled occupational therapy while IP to maximize patient function and increase patient participation, safety, and independence with basic ADL and everyday activities.       EDUCATION PROVIDED  Patient Education : Role of Occupational Therapy; Plan of Care; Discharge Recommendations; Functional Transfer Techniques; Fall Prevention; Weight Bear Status; Surgical Precautions; Posture/Positioning  Patient's Response to Education: Verbalized Understanding; Requires Further Education    The patient's Approx Degree of Impairment: 38.32% has been calculated based on documentation in the [facility] '6 clicks' Inpatient Daily Activity Short Form.  Research supports that patients with this level of impairment may benefit from HH.  Final disposition will be made by interdisciplinary medical team.    Patient End of Session: Up in chair, Needs met, Call light within reach, RN aware of session/findings, All patient questions and concerns addressed    OT Goals  Patient self-stated goal is: to return home     Patient will complete LE dressing with SBA with LHAE PRN  Comment:     Patient will complete toilet transfer with Mod I   Comment:     Patient will complete self care task at sink level with Mod I    Comment:    Patient will independently recall hip precautions  Comment:         Goals  on:   Frequency:1-2 more sessions    Patient Evaluation Complexity Level:    Occupational Profile/Medical History LOW - Brief history including review of medical or therapy records    Specific performance deficits impacting engagement in ADL/IADL LOW  1 - 3 performance deficits    Client Assessment/Performance Deficits LOW - No comorbidities nor modifications of tasks    Clinical Decision Making LOW - Analysis of occupational profile, problem-focused assessments, limited treatment options    Overall Complexity LOW     OT Session Time: 24 minutes  Self-Care Home Management: 12 minutes  Therapeutic Activity: 12 minutes    Ton Clancy, Occupational Therapist, OTR/L ext [phone]

## (undated) DEVICE — NEEDLE SPINAL 20X3-1/2 YELLOW

## (undated) DEVICE — SPECIMEN CONTAINER,POSITIVE SEAL INDICATOR, OR PACKAGED: Brand: PRECISION

## (undated) DEVICE — ECHELON FLEX  POWERED VASCULAR STAPLER WITH ADVANCED PLACEMENT TIP, 35MM: Brand: ECHELON FLEX

## (undated) DEVICE — ARYGLE SUCTION CATHETER WITH CHIMNEY VALVE STRIAGHT PACKED 14 FR/ CH: Brand: ARGYLE

## (undated) DEVICE — STERILE WATER 1000ML BTL

## (undated) DEVICE — SUTURE VICRYL 2-0 CP-1

## (undated) DEVICE — SYRINGE 30ML LL TIP

## (undated) DEVICE — ABSORBABLE HEMOSTAT (OXIDIZED REGENERATED CELLULOSE, U.S.P.): Brand: SURGICEL

## (undated) DEVICE — DRAPE,U/SHT,SPLIT,FILM,60X84,STERILE: Brand: MEDLINE

## (undated) DEVICE — STERILE POLYISOPRENE POWDER-FREE SURGICAL GLOVES: Brand: PROTEXIS

## (undated) DEVICE — TRAY SURESTEP 16 BARDEX UMETR

## (undated) DEVICE — BLADE ELECTRODE: Brand: EDGE

## (undated) DEVICE — SOL  .9 1000ML BTL

## (undated) DEVICE — DERMABOND CLOSURE 0.7ML TOPICL

## (undated) DEVICE — CV PACK-LF: Brand: MEDLINE INDUSTRIES, INC.

## (undated) DEVICE — 3M™ IOBAN™ 2 ANTIMICROBIAL INCISE DRAPE 6650EZ: Brand: IOBAN™ 2

## (undated) DEVICE — STANDARD HYPODERMIC NEEDLE,POLYPROPYLENE HUB: Brand: MONOJECT

## (undated) DEVICE — SUT MONOCRYL 4-0 PS-2 Y496G

## (undated) DEVICE — WOUND RETRACTOR AND PROTECTOR: Brand: ALEXIS WOUND PROTECTOR-RETRACTOR

## (undated) DEVICE — CATH HYDRAGLIDE XL 28F STRGHT

## (undated) DEVICE — SUT SILK 0 FSL 680H

## (undated) DEVICE — THE ECHELON, ECHELON ENDOPATH™ AND ECHELON FLEX™ FAMILIES OF ENDOSCOPIC LINEAR CUTTERS AND RELOADS ARE STERILE, SINGLE PATIENT USE INSTRUMENTS THAT SIMULTANEOUSLY CUT AND STAPLE TISSUE. THERE ARE SIX STAGGERED ROWS OF STAPLES, THREE ON EITHER SIDE OF THE CUT LINE. THE 45 MM INSTRUMENTS HAVE A STAPLE LINE THATIS APPROXIMATELY 45 MM LONG AND A CUT LINE THAT IS APPROXIMATELY 42 MM LONG. THE SHAFT CAN ROTATE FREELY IN BOTH DIRECTIONS AND AN ARTICULATION MECHANISM ON ARTICULATING INSTRUMENTS ENABLES BENDING THE DISTAL PORTIONOF THE SHAFT TO FACILITATE LATERAL ACCESS OF THE OPERATIVE SITE.THE INSTRUMENTS ARE SHIPPED WITHOUT A RELOAD AND MUST BE LOADED PRIOR TO USE. A STAPLE RETAINING CAP ON THE RELOAD PROTECTS THE STAPLE LEG POINTS DURING SHIPPING AND TRANSPORTATION. THE INSTRUMENTS’ LOCK-OUT FEATURE IS DESIGNED TO PREVENT A USED RELOAD FROM BEING REFIRED.: Brand: ECHELON ENDOPATH

## (undated) DEVICE — THE ECHELON FLEX POWERED PLUS ARTICULATING ENDOSCOPIC LINEAR CUTTERS ARE STERILE, SINGLE PATIENT USE INSTRUMENTS THAT SIMULTANEOUSLYCUT AND STAPLE TISSUE. THERE ARE SIX STAGGERED ROWS OF STAPLES, THREE ON EITHER SIDE OF THE CUT LINE. THE ECHELON FLEX 45 POWERED PLUSINSTRUMENTS HAVE A STAPLE LINE THAT IS APPROXIMATELY 45 MM LONG AND A CUT LINE THAT IS APPROXIMATELY 42 MM LONG. THE SHAFT CAN ROTATE FREELYIN BOTH DIRECTIONS AND AN ARTICULATION MECHANISM ENABLES THE DISTAL PORTION OF THE SHAFT TO PIVOT TO FACILITATE LATERAL ACCESS TO THE OPERATIVESITE.THE INSTRUMENTS ARE PACKAGED WITH A PRIMARY LITHIUM BATTERY PACK THAT MUST BE INSTALLED PRIOR TO USE. THERE ARE SPECIFIC REQUIREMENTS FORDISPOSING OF THE BATTERY PACK. REFER TO THE BATTERY PACK DISPOSAL SECTION.THE INSTRUMENTS ARE PACKAGED WITHOUT A RELOAD AND MUST BE LOADED PRIOR TO USE. A STAPLE RETAINING CAP ON THE RELOAD PROTECTS THE STAPLE LEGPOINTS DURING SHIPPING AND TRANSPORTATION. THE INSTRUMENTS’ LOCK-OUT FEATURE IS DESIGNED TO PREVENT A USED OR IMPROPERLY INSTALLED RELOADFROM BEING REFIRED OR AN INSTRUMENT FROM BEING FIRED WITHOUT A RELOAD.: Brand: ECHELON FLEX

## (undated) DEVICE — SUTURE STRATAFIX PDS PLUS 45CM

## (undated) DEVICE — DERMABOND LIQUID ADHESIVE

## (undated) DEVICE — GLOVE SURG TRIUMPH SZ 8

## (undated) DEVICE — SYRINGE 10ML LL TIP

## (undated) DEVICE — Device: Brand: STABLECUT®

## (undated) DEVICE — KENDALL SCD EXPRESS SLEEVES, KNEE LENGTH, MEDIUM: Brand: KENDALL SCD

## (undated) DEVICE — DRESSING AQUACEL AG 3.5X12

## (undated) DEVICE — INSTRUMENT FEE

## (undated) DEVICE — SLEEVE KENDALL SCD EXPRESS MED

## (undated) DEVICE — BOWL CEMENT MIX QUICK-VAC

## (undated) DEVICE — Device: Brand: PLUMEPEN

## (undated) DEVICE — ELECTRODE EDGE BLADE PENCIL

## (undated) DEVICE — NON-ADHERENT PAD PREPACK: Brand: TELFA

## (undated) DEVICE — GOWN SURG AERO CHROME XXL

## (undated) DEVICE — CHLORAPREP 26ML APPLICATOR

## (undated) DEVICE — GAUZE SPONGES,USP TYPE VII GAUZE, 12 PLY: Brand: CURITY

## (undated) DEVICE — TOTAL KNEE CDS: Brand: MEDLINE INDUSTRIES, INC.

## (undated) DEVICE — CONVERTORS STOCKINETTE: Brand: CONVERTORS

## (undated) DEVICE — SOLUTION ENDOSCOPIC ANTI-FOG NON-TOXIC NON-ABRASIVE 6 CUBIC CENTIMETER WITH RADIOPAQUE ADHESIVE-BACKED SPONGE STERILE NOT MADE WITH NATURAL RUBBER LATEX MEDICHOICE: Brand: MEDICHOICE

## (undated) DEVICE — ANCHOR TISSUE RETRIEVAL SYSTEM, BAG SIZE 1200 ML, PORT SIZE 15 MM: Brand: ANCHOR TISSUE RETRIEVAL SYSTEM

## (undated) DEVICE — ZIMMER® STERILE DISPOSABLE TOURNIQUET CUFF WITH PLC, DUAL PORT, SINGLE BLADDER, 34 IN. (86 CM)

## (undated) DEVICE — PADDING CAST COTTON  4

## (undated) DEVICE — SUT VICRYL 2-0 CT-1 J945H

## (undated) DEVICE — DECANTER BAG 9": Brand: MEDLINE INDUSTRIES, INC.

## (undated) DEVICE — MEDI-VAC NON-CONDUCTIVE SUCTION TUBING: Brand: CARDINAL HEALTH

## (undated) DEVICE — ENDOPATH ECHELON VASCULAR  RELOADS, WHITE, 35MM: Brand: ECHELON ENDOPATH

## (undated) DEVICE — BANDAGE ELASTIC ACE 6\" X-LONG

## (undated) DEVICE — WRAP COOLING KNEE W/ICE PILLOW

## (undated) DEVICE — 3M™ MICROFOAM™ TAPE 1528-4: Brand: 3M™ MICROFOAM™

## (undated) DEVICE — 3M™ COBAN™ NL STERILE NON-LATEX SELF-ADHERENT WRAP, 2084S, 4 IN X 5 YD (10 CM X 4,5 M), 18 ROLLS/CASE: Brand: 3M™ COBAN™

## (undated) DEVICE — HOOD, PEEL-AWAY: Brand: FLYTE

## (undated) DEVICE — 3M™ IOBAN™ 2 ANTIMICROBIAL INCISE DRAPE 6651EZ: Brand: IOBAN™ 2

## (undated) DEVICE — DRAIN ATRIUM OASIS CHEST DRY

## (undated) NOTE — IP AVS SNAPSHOT
[facility] [place] [address] [place], [address] ~ [phone]                Discharge Summary   3/9/2017    Aman Lucas           Admission Information        Provider Department    3/9/2017 Amrit James MD  [id number] Take 1-2 tablets by mouth every 4 (four) hours as needed for Pain. Not to exceed 10 tabs in 24 hours. Do not dispense until 3/8/17.     Juani Díaz                           rivaroxaban 10 MG Tabs   Commonly known as:  Carter Sanabria   Next dose due:  3/2 FOLLOW UP with Hansel Flynn MD   [address] [place] [address] ([address] [place] [address])    [address] [place] [address]   [phone]              Immunization History as of 3/21/2017  Never Reviewed    INFLUENZA  Deferred (+Patient Refuses 0.80 (03/21/17)  9.4 (03/13/17)  78 (03/13/17)  30      Metabolic Lab Results  (Last result in the past 90 days)    ALT Bilirubin,Total Total Protein Albumin Sodium Potassium Chloride    (03/13/17)  19 (03/13/17)  0.6 (03/13/17)  5.3 (L) (03/21/17)  2.7 (L Call [phone] for help. [employer] is NOT to be used for urgent needs.   For medical emergencies, dial 911.             _____________________________________________________________________________    Medication Side Effects - Medications to be taken at changes, changes in sleep and alertness, palpitations   What to report to your healthcare team:  Changes in thinking, confusion, skin swelling, palpitations, dizziness           All Other Medications     Multiple Vitamins-Minerals (TAB-A-ANDRESSA MAXIMUM) Oral

## (undated) NOTE — ED AVS SNAPSHOT
Kody Johnston   MRN: [id number]    Department:  [facility] Emergency Department   Date of Visit:  9/21/2018           Disclosure     Insurance plans vary and the physician(s) referred by the ER may not be covered by your plan.  Please contact you tell this physician (or your personal doctor if your instructions are to return to your personal doctor) about any new or lasting problems. The primary care or specialist physician will see patients referred from the [facility] Emergency Department.  Nereida Ramires

## (undated) NOTE — LETTER
[facility]  Phone: [phone]  Right Fax: [phone]  \[facility] [place] [facility]\"" 20 By: Mary Lou Botello RNI Date: 17    Patient Name: Marsha Flores  Surgery Date: 2017    CSN: [id number]  Medical Record: [id number]   : 2

## (undated) NOTE — ED AVS SNAPSHOT
[facility] Emergency Department    [place]  [address]    [place] [address]    Phone:  [phone]    Fax:  [phone]   MRN: [id number]    Department:  [facility] Emergency Department   Date of Visit:  6/23 IF THERE IS ANY CHANGE OR WORSENING OF YOUR CONDITION, CALL YOUR PRIMARY CARE PHYSICIAN AT ONCE OR RETURN IMMEDIATELY TO THE EMERGENCY DEPARTMENT.     If you have been prescribed any medication(s), please fill your prescription right away and begin taking t

## (undated) NOTE — Clinical Note
I saw your patient, Ms. Arsen Allen. Please see attached note for my assessment and plans moving forward. Thank you for involving her care.   Please feel free to call me with any questions at [place] [address] [facility]

## (undated) NOTE — Clinical Note
Ms. Amairani Serrano is doing well. Please see attached note for an update on her operation and pathology. Please feel free to call me with any questions at [phone].   Thank you,  Ruffin [facility]

## (undated) NOTE — ED AVS SNAPSHOT
Moustapha Jacobo   MRN: [id number]    Department:  [facility] Emergency Department   Date of Visit:  9/15/2018           Disclosure     Insurance plans vary and the physician(s) referred by the ER may not be covered by your plan.  Please contact you tell this physician (or your personal doctor if your instructions are to return to your personal doctor) about any new or lasting problems. The primary care or specialist physician will see patients referred from the [facility] Emergency Department.  Brittanie Das

## (undated) NOTE — IP AVS SNAPSHOT
[facility] [place] [address] [place], [address] ~ [phone]                Discharge Summary   2/27/2017    Rosemarie Lucas           Admission Information        Provider Department    2/27/2017 Manny Fan MD  [id number]         Bucky Garcia exceed 10 tabs in 24 hours. Do not dispense until 3/8/17.     Anila Klein                           rivaroxaban 10 MG Tabs   Last time this was given:  10 mg on 3/1/2017  4:33 AM   Commonly known as:  Luann Stanley   Next dose due:  Thursday morning Continue to perform active ankle pumps at home. Medications:  Xarelto 10 mg 1 tab daily: This is a blood thinner to prevent blood clot. Do not take with other blood thinners or with anti-inflammatory medications.   (aspirin is ok)  Norco 10 mg 1-2 tab( ? Usually allowed after four to six weeks – check with surgeon at your office visit. Return to work  ? Usually allowed after four to six weeks. Discuss specific work activities with your surgeon. Restrictions  ?  For knee replacement surgery, fo ? You will bleed easier and bruise easier while on these medications. ? Usually you will be on a blood thinner for about 2-3 weeks. ? Contact your physician if you have signs of bruising, nose bleeds or blood in your urine.  Use electric razors and sof advantage of everything available to your to help control you discomfort. ? Contact physician if discomfort does not respond to pain medication. Body changes  ? Constipation is common with the use of narcotics.   ? Eat fiber rich foods and drink plenty the hospital; much better for you to catch developing problems and prevent them from becoming larger ones. ? RITA HOSE – IF ordered by your surgeon, wear these during the day and off at night.         Notify your surgeon if you notice any  of the following the same service. ([facility] and [place] have this service; if you live in another [facility], you may check with them as well).  You need space to open car doors to position yourself properly with walker to get in and out of your car safely; some [address] [facility])    [facility]   [phone]            Mar 22, 2017 11:00 AM   Rehabilitation with [facility] - [place] Apr 10, 2017 12:30 PM   REHABILITATION with Edgar Isaac, PT   [address] [facility])    [address] [place] [address]   [phone]            Apr 12, 2017  1:30 PM   THE [facility] [address] [place] [address]   [phone]              Discharge Orders     Future Labs/Procedures Expected by Expires    JOINT CLASS  2/6/2017 2/27/2017      Immunization History as of 3/1/2017  Never Reviewed    INFLUENZA  Deferred (+Patient Refuses Z28.21), 9/ Patient Belongings       Most Recent Value    All belongings returned to patient at discharge Pt's bedside belongings    Medications Sent Home None to return    Medications Returned:           Additional Information       We are concerned for your overall w review your medications with you before you are discharged, and can provide you with additional printed information. Not all patients will experience these side effects or respond to medications the same.  Please call your provider or healthcare team if you